# Patient Record
Sex: FEMALE | Race: BLACK OR AFRICAN AMERICAN | NOT HISPANIC OR LATINO | Employment: OTHER | URBAN - METROPOLITAN AREA
[De-identification: names, ages, dates, MRNs, and addresses within clinical notes are randomized per-mention and may not be internally consistent; named-entity substitution may affect disease eponyms.]

---

## 2017-01-18 ENCOUNTER — TELEPHONE (OUTPATIENT)
Dept: INTERNAL MEDICINE | Facility: CLINIC | Age: 62
End: 2017-01-18

## 2017-01-18 NOTE — TELEPHONE ENCOUNTER
----- Message from Jeovany Diaz sent at 1/17/2017  3:57 PM CST -----  Contact: self   Patient would like to get test results.  Name of test (lab, mammo, etc.):  X-ray of toe   Date of test:  11/28  Ordering provider: Dr. Mustafa   Where was the test performed:  [facility]  Comments:

## 2017-02-10 ENCOUNTER — OFFICE VISIT (OUTPATIENT)
Dept: INTERNAL MEDICINE | Facility: CLINIC | Age: 62
End: 2017-02-10
Payer: COMMERCIAL

## 2017-02-10 ENCOUNTER — LAB VISIT (OUTPATIENT)
Dept: LAB | Facility: HOSPITAL | Age: 62
End: 2017-02-10
Attending: INTERNAL MEDICINE
Payer: COMMERCIAL

## 2017-02-10 VITALS
WEIGHT: 145.94 LBS | SYSTOLIC BLOOD PRESSURE: 113 MMHG | HEART RATE: 86 BPM | TEMPERATURE: 98 F | HEIGHT: 66 IN | DIASTOLIC BLOOD PRESSURE: 71 MMHG | RESPIRATION RATE: 12 BRPM | BODY MASS INDEX: 23.46 KG/M2

## 2017-02-10 DIAGNOSIS — R10.31 RLQ ABDOMINAL PAIN: Primary | ICD-10-CM

## 2017-02-10 DIAGNOSIS — R10.31 RLQ ABDOMINAL PAIN: ICD-10-CM

## 2017-02-10 LAB
ALBUMIN SERPL BCP-MCNC: 3.7 G/DL
ALP SERPL-CCNC: 100 U/L
ALT SERPL W/O P-5'-P-CCNC: 61 U/L
ANION GAP SERPL CALC-SCNC: 9 MMOL/L
AST SERPL-CCNC: 26 U/L
BASOPHILS # BLD AUTO: 0.02 K/UL
BASOPHILS NFR BLD: 0.3 %
BILIRUB SERPL-MCNC: 0.6 MG/DL
BUN SERPL-MCNC: 8 MG/DL
CALCIUM SERPL-MCNC: 9.5 MG/DL
CHLORIDE SERPL-SCNC: 102 MMOL/L
CO2 SERPL-SCNC: 28 MMOL/L
CREAT SERPL-MCNC: 0.9 MG/DL
CRP SERPL-MCNC: 65.3 MG/L
DIFFERENTIAL METHOD: ABNORMAL
EOSINOPHIL # BLD AUTO: 0.1 K/UL
EOSINOPHIL NFR BLD: 1.8 %
ERYTHROCYTE [DISTWIDTH] IN BLOOD BY AUTOMATED COUNT: 12.5 %
ERYTHROCYTE [SEDIMENTATION RATE] IN BLOOD BY WESTERGREN METHOD: 56 MM/HR
EST. GFR  (AFRICAN AMERICAN): >60 ML/MIN/1.73 M^2
EST. GFR  (NON AFRICAN AMERICAN): >60 ML/MIN/1.73 M^2
GLUCOSE SERPL-MCNC: 98 MG/DL
HCT VFR BLD AUTO: 38.8 %
HGB BLD-MCNC: 12.5 G/DL
LYMPHOCYTES # BLD AUTO: 1.3 K/UL
LYMPHOCYTES NFR BLD: 18.5 %
MCH RBC QN AUTO: 29.2 PG
MCHC RBC AUTO-ENTMCNC: 32.2 %
MCV RBC AUTO: 91 FL
MONOCYTES # BLD AUTO: 0.4 K/UL
MONOCYTES NFR BLD: 6.2 %
NEUTROPHILS # BLD AUTO: 4.9 K/UL
NEUTROPHILS NFR BLD: 73.2 %
PLATELET # BLD AUTO: 330 K/UL
PMV BLD AUTO: 9.4 FL
POTASSIUM SERPL-SCNC: 4.1 MMOL/L
PROT SERPL-MCNC: 7.9 G/DL
RBC # BLD AUTO: 4.28 M/UL
SODIUM SERPL-SCNC: 139 MMOL/L
WBC # BLD AUTO: 6.74 K/UL

## 2017-02-10 PROCEDURE — 86140 C-REACTIVE PROTEIN: CPT

## 2017-02-10 PROCEDURE — 85651 RBC SED RATE NONAUTOMATED: CPT

## 2017-02-10 PROCEDURE — 80053 COMPREHEN METABOLIC PANEL: CPT

## 2017-02-10 PROCEDURE — 99214 OFFICE O/P EST MOD 30 MIN: CPT | Mod: S$GLB,,, | Performed by: INTERNAL MEDICINE

## 2017-02-10 PROCEDURE — 99999 PR PBB SHADOW E&M-EST. PATIENT-LVL III: CPT | Mod: PBBFAC,,, | Performed by: INTERNAL MEDICINE

## 2017-02-10 PROCEDURE — 85025 COMPLETE CBC W/AUTO DIFF WBC: CPT

## 2017-02-10 PROCEDURE — 36415 COLL VENOUS BLD VENIPUNCTURE: CPT | Mod: PO

## 2017-02-10 NOTE — PROGRESS NOTES
Subjective:       Patient ID: Maya Ervin is a 61 y.o. female.    Chief Complaint: Abdominal Cramping    HPI     61-year-old female here for evaluation of abdominal cramping.  She has had gas pains for a week.  She was walking and had a cramp in the abdominal area.  She noticed that she had pain in the LLQ area.  This then goes around in her stomach area.  She releases gas and feels fine.  This keeps happening.  She feels bloated and cramping.  She improves after she has gas.  This has been going on a week.  No nausea, vomiting, changes in bowel habits.  She tried milk of magnesia.  She usually goes twice a day.  In the last week, she has not been going twice a day, but is not eating as much as she usually does.  She is having at least one bowel movement a day in the last week.  She is eating and having a bowel movement.  She has had no recent antibiotics.  She has not tried probiotics.  She has not tried anything OTC.  Her diet has not changed leading up to this change in her abdomen.      Review of Systems    Objective:      Physical Exam   Constitutional: She is oriented to person, place, and time. She appears well-developed and well-nourished.   HENT:   Head: Normocephalic and atraumatic.   Mouth/Throat: No oropharyngeal exudate.   Eyes: EOM are normal. Pupils are equal, round, and reactive to light. Right eye exhibits no discharge. Left eye exhibits no discharge. No scleral icterus.   Neck: Normal range of motion. Neck supple. No tracheal deviation present. No thyromegaly present.   Cardiovascular: Normal rate, regular rhythm and normal heart sounds.  Exam reveals no gallop and no friction rub.    No murmur heard.  Pulmonary/Chest: Effort normal and breath sounds normal. No respiratory distress. She has no wheezes. She has no rales. She exhibits no tenderness.   Abdominal: Soft. Bowel sounds are normal. She exhibits no distension and no mass. There is tenderness in the right lower quadrant. There is no  rebound and no guarding.   Musculoskeletal: Normal range of motion. She exhibits no edema or tenderness.   Neurological: She is alert and oriented to person, place, and time.   Skin: Skin is warm and dry. No rash noted. No erythema. No pallor.   Psychiatric: She has a normal mood and affect. Her behavior is normal.   Vitals reviewed.      Assessment:       1. RLQ abdominal pain        Plan:       1.  Check CBC, CMP, ESR, CRP.  Check CT scan abdomen pelvis.  Concern for possible infection.  If CT scan negative, would consider prescribing Augmentin twice a day for 10 days.  Patient recommended to get probiotics and take these in the meantime.

## 2017-02-11 ENCOUNTER — TELEPHONE (OUTPATIENT)
Dept: INTERNAL MEDICINE | Facility: CLINIC | Age: 62
End: 2017-02-11

## 2017-02-11 RX ORDER — CIPROFLOXACIN 500 MG/1
500 TABLET ORAL 2 TIMES DAILY
Qty: 20 TABLET | Refills: 0 | Status: SHIPPED | OUTPATIENT
Start: 2017-02-11 | End: 2017-02-18

## 2017-02-11 RX ORDER — METRONIDAZOLE 500 MG/1
500 TABLET ORAL 3 TIMES DAILY
Qty: 30 TABLET | Refills: 0 | Status: SHIPPED | OUTPATIENT
Start: 2017-02-11 | End: 2017-02-21

## 2017-02-13 ENCOUNTER — TELEPHONE (OUTPATIENT)
Dept: INTERNAL MEDICINE | Facility: CLINIC | Age: 62
End: 2017-02-13

## 2017-02-13 NOTE — TELEPHONE ENCOUNTER
----- Message from Paco Wahl MD sent at 2/10/2017  1:50 PM CST -----  Blood counts, electro lites, kidney/liver function are normal.  Patient's CRP is elevated.  Awaiting sedimentation rate.  If this also elevated, would be inclined to call in Cipro and Flagyl.

## 2017-02-13 NOTE — TELEPHONE ENCOUNTER
----- Message from Paco Wahl MD sent at 2/11/2017  7:52 AM CST -----  Sedimentation rate also positive.  Calling in Cipro 500 mg twice a day ×10 days, Flagyl  500 mg 3 times a day ×10 days.

## 2017-02-13 NOTE — TELEPHONE ENCOUNTER
Between history lab work, I thought that this was an infection of her colon.  She would need to take both the Cipro and Flagyl in order to treat the infection.  If she's not to take both, she should not take either.

## 2017-02-13 NOTE — TELEPHONE ENCOUNTER
----- Message from Isabela Alvarenga sent at 2/13/2017  8:26 AM CST -----  Contact: patient on cell [phone]  Patient asked to speak with Juanita .  ordered two medications and she only took one of them because of the side effects. Pt is feeling better and canceled the ct scan. Pt said that if it is necessary to have this test, she would prefer to go to main campus but at this time because she is feeling better. Pt also started Activia before starting the medication.    The medication that patient didn't take is ciprofloxacin. After reading the warnings pt felt like the risks weren't worth the benefits.

## 2017-02-14 NOTE — TELEPHONE ENCOUNTER
Called for prior authorization.    Kaylyn - nurse reviewer.    Through for March 11th.    [phone]

## 2017-02-17 ENCOUNTER — TELEPHONE (OUTPATIENT)
Dept: INTERNAL MEDICINE | Facility: CLINIC | Age: 62
End: 2017-02-17

## 2017-02-17 ENCOUNTER — HOSPITAL ENCOUNTER (OUTPATIENT)
Dept: RADIOLOGY | Facility: OTHER | Age: 62
Discharge: HOME | End: 2017-02-17
Attending: INTERNAL MEDICINE
Payer: COMMERCIAL

## 2017-02-17 DIAGNOSIS — R10.31 RLQ ABDOMINAL PAIN: ICD-10-CM

## 2017-02-17 PROCEDURE — 74177 CT ABD & PELVIS W/CONTRAST: CPT | Mod: 26,,, | Performed by: RADIOLOGY

## 2017-02-17 PROCEDURE — 74177 CT ABD & PELVIS W/CONTRAST: CPT | Mod: TC

## 2017-02-17 PROCEDURE — 25500020 PHARM REV CODE 255: Performed by: INTERNAL MEDICINE

## 2017-02-17 RX ADMIN — IOHEXOL 75 ML: 350 INJECTION, SOLUTION INTRAVENOUS at 12:02

## 2017-02-17 NOTE — TELEPHONE ENCOUNTER
----- Message from Paco Wahl MD sent at 2/17/2017  1:22 PM CST -----  Patient has colitis, which necessitates treatment with Cipro and Flagyl.  This is an infection of patient's bowel.  Patient does have a small fat-containing umbilical hernia, which is not likely to be contributing to her pain.  If she fails to improve with Cipro and Flagyl, needs to go to emergency room.

## 2017-02-17 NOTE — TELEPHONE ENCOUNTER
Patient informed me as stated  Patient did rescheduled u/s from 2/24/ to this morning.  Please advise.

## 2017-02-17 NOTE — TELEPHONE ENCOUNTER
"----- Message from Carla Balderrama sent at 2/17/2017  7:03 AM CST -----  Contact: Self  [phone] ONLY  Last seen on 2/10.  Her problems  is worst then when she saw you and not getting better.  She's still has stomach discomfort, bloating and nauseating and a pain above her belly button. She has a decrease in her appetite. [facility]   [phone] (Phone)  [phone] (Fax).    Please, if you need to speak with her please "ONLY" call her and not her emergency .   "

## 2017-02-17 NOTE — TELEPHONE ENCOUNTER
Spoke with patient and informed per Dr. Wahl.  Patient states she's at [facility] awaiting the CT scan.  She states she'd rather wait for the results than go to the ER.    Patient informed that if she does worsen, that she needs to report to the ER.    Dr. Wahl informed.

## 2017-02-20 ENCOUNTER — OFFICE VISIT (OUTPATIENT)
Dept: INTERNAL MEDICINE | Facility: CLINIC | Age: 62
End: 2017-02-20
Payer: COMMERCIAL

## 2017-02-20 VITALS
DIASTOLIC BLOOD PRESSURE: 88 MMHG | HEIGHT: 66 IN | TEMPERATURE: 98 F | WEIGHT: 143.5 LBS | BODY MASS INDEX: 23.06 KG/M2 | HEART RATE: 97 BPM | RESPIRATION RATE: 16 BRPM | SYSTOLIC BLOOD PRESSURE: 139 MMHG

## 2017-02-20 DIAGNOSIS — K59.00 CONSTIPATION, UNSPECIFIED CONSTIPATION TYPE: ICD-10-CM

## 2017-02-20 DIAGNOSIS — K52.9 COLITIS PRESUMED INFECTIOUS: Primary | ICD-10-CM

## 2017-02-20 PROCEDURE — 99213 OFFICE O/P EST LOW 20 MIN: CPT | Mod: S$GLB,,, | Performed by: INTERNAL MEDICINE

## 2017-02-20 PROCEDURE — 99999 PR PBB SHADOW E&M-EST. PATIENT-LVL III: CPT | Mod: PBBFAC,,, | Performed by: INTERNAL MEDICINE

## 2017-02-20 PROCEDURE — 1160F RVW MEDS BY RX/DR IN RCRD: CPT | Mod: S$GLB,,, | Performed by: INTERNAL MEDICINE

## 2017-05-31 ENCOUNTER — OFFICE VISIT (OUTPATIENT)
Dept: OPTOMETRY | Facility: CLINIC | Age: 62
End: 2017-05-31
Payer: COMMERCIAL

## 2017-05-31 DIAGNOSIS — H35.9 RETINAL DISORDER, LEFT: Primary | ICD-10-CM

## 2017-05-31 DIAGNOSIS — H25.13 NUCLEAR SCLEROSIS, BILATERAL: ICD-10-CM

## 2017-05-31 PROCEDURE — 92015 DETERMINE REFRACTIVE STATE: CPT | Mod: S$GLB,,, | Performed by: OPTOMETRIST

## 2017-05-31 PROCEDURE — 99999 PR PBB SHADOW E&M-EST. PATIENT-LVL II: CPT | Mod: PBBFAC,,, | Performed by: OPTOMETRIST

## 2017-05-31 PROCEDURE — 92014 COMPRE OPH EXAM EST PT 1/>: CPT | Mod: S$GLB,,, | Performed by: OPTOMETRIST

## 2017-05-31 NOTE — PROGRESS NOTES
HPI     DLS: 5/30/2016  Patient denies blurry vision, or seeing flashes of light or floaters   (-) pain  (-) irritation  (-) eye drops     Last edited by Andra Harris on 5/31/2017  7:35 AM. (History)        ROS     Positive for: Eyes    Negative for: Constitutional, Gastrointestinal, Neurological, Skin,   Genitourinary, Musculoskeletal, HENT, Endocrine, Cardiovascular,   Respiratory, Psychiatric, Allergic/Imm, Heme/Lymph    Last edited by Morales Cordoba, OD on 5/31/2017  7:46 AM. (History)        Assessment /Plan     For exam results, see Encounter Report.    Retinal disorder, left    Nuclear sclerosis, bilateral      1. [facility] OU--wrote new spex Rx  2. Pt has hyperemic disc OS w prominent vasculature, and arcade vessel inferior which is lifted into the vit. Suspected was just be AV malformation, but set up retinal appt last year for pt, but she no showed.   she says she couldn't make it due to death in family, and never rescheduled.      PLAN:    Consult--Dr Thornton.  Pt left w appt slip in hand

## 2017-11-16 DIAGNOSIS — E55.9 VITAMIN D DEFICIENCY: ICD-10-CM

## 2017-11-16 DIAGNOSIS — Z00.00 ROUTINE GENERAL MEDICAL EXAMINATION AT A HEALTH CARE FACILITY: ICD-10-CM

## 2017-11-16 DIAGNOSIS — E78.5 HYPERLIPIDEMIA, UNSPECIFIED HYPERLIPIDEMIA TYPE: Primary | ICD-10-CM

## 2017-11-21 ENCOUNTER — LAB VISIT (OUTPATIENT)
Dept: LAB | Facility: HOSPITAL | Age: 62
End: 2017-11-21
Attending: INTERNAL MEDICINE
Payer: COMMERCIAL

## 2017-11-21 DIAGNOSIS — E78.5 HYPERLIPIDEMIA, UNSPECIFIED HYPERLIPIDEMIA TYPE: ICD-10-CM

## 2017-11-21 DIAGNOSIS — E55.9 VITAMIN D DEFICIENCY: ICD-10-CM

## 2017-11-21 DIAGNOSIS — Z00.00 ROUTINE GENERAL MEDICAL EXAMINATION AT A HEALTH CARE FACILITY: ICD-10-CM

## 2017-11-21 LAB
25(OH)D3+25(OH)D2 SERPL-MCNC: 17 NG/ML
ALBUMIN SERPL BCP-MCNC: 3.8 G/DL
ALP SERPL-CCNC: 78 U/L
ALT SERPL W/O P-5'-P-CCNC: 22 U/L
ANION GAP SERPL CALC-SCNC: 7 MMOL/L
AST SERPL-CCNC: 23 U/L
BASOPHILS # BLD AUTO: 0.02 K/UL
BASOPHILS NFR BLD: 0.6 %
BILIRUB SERPL-MCNC: 0.5 MG/DL
BUN SERPL-MCNC: 9 MG/DL
CALCIUM SERPL-MCNC: 9.5 MG/DL
CHLORIDE SERPL-SCNC: 102 MMOL/L
CHOLEST SERPL-MCNC: 239 MG/DL
CHOLEST/HDLC SERPL: 3.4 {RATIO}
CO2 SERPL-SCNC: 29 MMOL/L
CREAT SERPL-MCNC: 0.8 MG/DL
DIFFERENTIAL METHOD: ABNORMAL
EOSINOPHIL # BLD AUTO: 0 K/UL
EOSINOPHIL NFR BLD: 1.1 %
ERYTHROCYTE [DISTWIDTH] IN BLOOD BY AUTOMATED COUNT: 12.6 %
EST. GFR  (AFRICAN AMERICAN): >60 ML/MIN/1.73 M^2
EST. GFR  (NON AFRICAN AMERICAN): >60 ML/MIN/1.73 M^2
GLUCOSE SERPL-MCNC: 95 MG/DL
HCT VFR BLD AUTO: 40 %
HDLC SERPL-MCNC: 70 MG/DL
HDLC SERPL: 29.3 %
HGB BLD-MCNC: 12.8 G/DL
IMM GRANULOCYTES # BLD AUTO: 0.01 K/UL
IMM GRANULOCYTES NFR BLD AUTO: 0.3 %
LDLC SERPL CALC-MCNC: 145.6 MG/DL
LYMPHOCYTES # BLD AUTO: 1.2 K/UL
LYMPHOCYTES NFR BLD: 33.4 %
MCH RBC QN AUTO: 29.1 PG
MCHC RBC AUTO-ENTMCNC: 32 G/DL
MCV RBC AUTO: 91 FL
MONOCYTES # BLD AUTO: 0.2 K/UL
MONOCYTES NFR BLD: 6.4 %
NEUTROPHILS # BLD AUTO: 2.1 K/UL
NEUTROPHILS NFR BLD: 58.2 %
NONHDLC SERPL-MCNC: 169 MG/DL
NRBC BLD-RTO: 0 /100 WBC
PLATELET # BLD AUTO: 309 K/UL
PMV BLD AUTO: 9.6 FL
POTASSIUM SERPL-SCNC: 3.9 MMOL/L
PROT SERPL-MCNC: 7.9 G/DL
RBC # BLD AUTO: 4.4 M/UL
SODIUM SERPL-SCNC: 138 MMOL/L
T4 FREE SERPL-MCNC: 0.98 NG/DL
TRIGL SERPL-MCNC: 117 MG/DL
TSH SERPL DL<=0.005 MIU/L-ACNC: 4.25 UIU/ML
WBC # BLD AUTO: 3.62 K/UL

## 2017-11-21 PROCEDURE — 84439 ASSAY OF FREE THYROXINE: CPT

## 2017-11-21 PROCEDURE — 80053 COMPREHEN METABOLIC PANEL: CPT

## 2017-11-21 PROCEDURE — 84443 ASSAY THYROID STIM HORMONE: CPT

## 2017-11-21 PROCEDURE — 82306 VITAMIN D 25 HYDROXY: CPT

## 2017-11-21 PROCEDURE — 36415 COLL VENOUS BLD VENIPUNCTURE: CPT | Mod: PO

## 2017-11-21 PROCEDURE — 85025 COMPLETE CBC W/AUTO DIFF WBC: CPT

## 2017-11-21 PROCEDURE — 80061 LIPID PANEL: CPT

## 2017-11-29 ENCOUNTER — TELEPHONE (OUTPATIENT)
Dept: INTERNAL MEDICINE | Facility: CLINIC | Age: 62
End: 2017-11-29

## 2017-11-29 DIAGNOSIS — Z12.39 SCREENING BREAST EXAMINATION: Primary | ICD-10-CM

## 2017-11-29 NOTE — TELEPHONE ENCOUNTER
----- Message from Torie iSms sent at 11/29/2017  1:44 PM CST -----  Contact: Self/[phone]  Patient would like to schedule their annual mammogram appointment, please enter the order and contact the patient to schedule.

## 2018-03-20 ENCOUNTER — HOSPITAL ENCOUNTER (OUTPATIENT)
Dept: RADIOLOGY | Facility: HOSPITAL | Age: 63
Discharge: HOME | End: 2018-03-20
Attending: INTERNAL MEDICINE
Payer: COMMERCIAL

## 2018-03-20 ENCOUNTER — OFFICE VISIT (OUTPATIENT)
Dept: INTERNAL MEDICINE | Facility: CLINIC | Age: 63
End: 2018-03-20
Payer: COMMERCIAL

## 2018-03-20 VITALS
WEIGHT: 147.5 LBS | BODY MASS INDEX: 24.17 KG/M2 | DIASTOLIC BLOOD PRESSURE: 80 MMHG | HEART RATE: 88 BPM | RESPIRATION RATE: 14 BRPM | SYSTOLIC BLOOD PRESSURE: 110 MMHG | TEMPERATURE: 99 F

## 2018-03-20 DIAGNOSIS — E55.9 VITAMIN D DEFICIENCY: ICD-10-CM

## 2018-03-20 DIAGNOSIS — Z12.39 SCREENING BREAST EXAMINATION: ICD-10-CM

## 2018-03-20 DIAGNOSIS — R79.89 ELEVATED TSH: ICD-10-CM

## 2018-03-20 DIAGNOSIS — Z00.00 WELL ADULT EXAM: Primary | ICD-10-CM

## 2018-03-20 DIAGNOSIS — E78.5 HYPERLIPIDEMIA, UNSPECIFIED HYPERLIPIDEMIA TYPE: ICD-10-CM

## 2018-03-20 PROCEDURE — 99396 PREV VISIT EST AGE 40-64: CPT | Mod: S$GLB,,, | Performed by: INTERNAL MEDICINE

## 2018-03-20 PROCEDURE — 77067 SCR MAMMO BI INCL CAD: CPT | Mod: TC,PO

## 2018-03-20 PROCEDURE — 99999 PR PBB SHADOW E&M-EST. PATIENT-LVL III: CPT | Mod: PBBFAC,,, | Performed by: INTERNAL MEDICINE

## 2018-03-20 PROCEDURE — 77063 BREAST TOMOSYNTHESIS BI: CPT | Mod: 26,,, | Performed by: RADIOLOGY

## 2018-03-20 PROCEDURE — 77067 SCR MAMMO BI INCL CAD: CPT | Mod: 26,,, | Performed by: RADIOLOGY

## 2018-03-20 NOTE — PROGRESS NOTES
Subjective:       Patient ID: Maya Ervin is a 62 y.o. female.    Chief Complaint: Annual Exam and Hyperlipidemia    HPI   The patient presents for annual physical examination.  She has been feeling well.  We discussed reducing fat intake in the diet to help manage her hyperlipidemia.  She is no longer experiencing any gastrointestinal symptoms.  She is exercising occasionally.  She is resting well at night.  She will be due for her colonoscopy in 2019.  The medical record was reviewed.  Medication list was reconciled.    Review of Systems   Constitutional: Negative for fatigue, fever and unexpected weight change.   HENT: Negative for congestion, postnasal drip, rhinorrhea and sore throat.    Eyes: Negative for visual disturbance.   Respiratory: Negative for cough, chest tightness, shortness of breath and wheezing.    Cardiovascular: Negative for chest pain, palpitations and leg swelling.   Gastrointestinal: Negative for abdominal pain and blood in stool.   Genitourinary: Negative for dysuria, frequency and hematuria.   Musculoskeletal: Negative for arthralgias, back pain, joint swelling and myalgias.   Skin: Negative for rash.   Neurological: Negative for dizziness, syncope, weakness, numbness and headaches.   Psychiatric/Behavioral: Negative for sleep disturbance. The patient is not nervous/anxious.        Objective:      Physical Exam   Constitutional: She is oriented to person, place, and time. Vital signs are normal. She appears well-developed and well-nourished. No distress.   HENT:   Head: Normocephalic and atraumatic.   Right Ear: External ear normal.   Left Ear: External ear normal.   Nose: Nose normal.   Mouth/Throat: Oropharynx is clear and moist. No oropharyngeal exudate.   Eyes: Conjunctivae and EOM are normal. Pupils are equal, round, and reactive to light. No scleral icterus.   Neck: Normal range of motion. Neck supple. No JVD present. Carotid bruit is not present. No thyromegaly present.    Cardiovascular: Normal rate, regular rhythm, normal heart sounds, intact distal pulses and normal pulses.  Exam reveals no gallop and no friction rub.    No murmur heard.  Pulmonary/Chest: Effort normal and breath sounds normal. No respiratory distress. She has no wheezes. She has no rales.   Abdominal: Soft. Bowel sounds are normal. She exhibits no abdominal bruit and no mass. There is no splenomegaly or hepatomegaly. There is no tenderness. No hernia.   Musculoskeletal: Normal range of motion. She exhibits no edema or tenderness.        Right shoulder: She exhibits no effusion and no deformity.   Lymphadenopathy:     She has no cervical adenopathy.     She has no axillary adenopathy.        Right: No supraclavicular adenopathy present.        Left: No supraclavicular adenopathy present.   Neurological: She is alert and oriented to person, place, and time. She has normal strength. No cranial nerve deficit.   Skin: Skin is warm and dry. No rash noted.   Psychiatric: She has a normal mood and affect. Her speech is normal and behavior is normal.   Nursing note and vitals reviewed.      Results for orders placed or performed in visit on 11/21/17   CBC auto differential   Result Value Ref Range    WBC 3.62 (L) 3.90 - 12.70 K/uL    RBC 4.40 4.00 - 5.40 M/uL    Hemoglobin 12.8 12.0 - 16.0 g/dL    Hematocrit 40.0 37.0 - 48.5 %    MCV 91 82 - 98 fL    MCH 29.1 27.0 - 31.0 pg    MCHC 32.0 32.0 - 36.0 g/dL    RDW 12.6 11.5 - 14.5 %    Platelets 309 150 - 350 K/uL    MPV 9.6 9.2 - 12.9 fL    Immature Granulocytes 0.3 0.0 - 0.5 %    Gran # (ANC) 2.1 1.8 - 7.7 K/uL    Immature Grans (Abs) 0.01 0.00 - 0.04 K/uL    Lymph # 1.2 1.0 - 4.8 K/uL    Mono # 0.2 (L) 0.3 - 1.0 K/uL    Eos # 0.0 0.0 - 0.5 K/uL    Baso # 0.02 0.00 - 0.20 K/uL    nRBC 0 0 /100 WBC    Gran% 58.2 38.0 - 73.0 %    Lymph% 33.4 18.0 - 48.0 %    Mono% 6.4 4.0 - 15.0 %    Eosinophil% 1.1 0.0 - 8.0 %    Basophil% 0.6 0.0 - 1.9 %    Differential Method Automated     Comprehensive metabolic panel   Result Value Ref Range    Sodium 138 136 - 145 mmol/L    Potassium 3.9 3.5 - 5.1 mmol/L    Chloride 102 95 - 110 mmol/L    CO2 29 23 - 29 mmol/L    Glucose 95 70 - 110 mg/dL    BUN, Bld 9 6 - 20 mg/dL    Creatinine 0.8 0.5 - 1.4 mg/dL    Calcium 9.5 8.7 - 10.5 mg/dL    Total Protein 7.9 6.0 - 8.4 g/dL    Albumin 3.8 3.5 - 5.2 g/dL    Total Bilirubin 0.5 0.1 - 1.0 mg/dL    Alkaline Phosphatase 78 55 - 135 U/L    AST 23 10 - 40 U/L    ALT 22 10 - 44 U/L    Anion Gap 7 (L) 8 - 16 mmol/L    eGFR if African American >60.0 >60 mL/min/1.73 m^2    eGFR if non African American >60.0 >60 mL/min/1.73 m^2   Lipid panel   Result Value Ref Range    Cholesterol 239 (H) 120 - 199 mg/dL    Triglycerides 117 30 - 150 mg/dL    HDL 70 40 - 75 mg/dL    LDL Cholesterol 145.6 63.0 - 159.0 mg/dL    HDL/Chol Ratio 29.3 20.0 - 50.0 %    Total Cholesterol/HDL Ratio 3.4 2.0 - 5.0    Non-HDL Cholesterol 169 mg/dL   TSH   Result Value Ref Range    TSH 4.253 (H) 0.400 - 4.000 uIU/mL   Vitamin D   Result Value Ref Range    Vit D, 25-Hydroxy 17 (L) 30 - 96 ng/mL   T4, free   Result Value Ref Range    Free T4 0.98 0.71 - 1.51 ng/dL       Assessment:       1. Well adult exam    2. Hyperlipidemia, unspecified hyperlipidemia type    3. Vitamin D deficiency    4. Elevated TSH        Plan:       Maya was seen today for annual exam and hyperlipidemia.  The patient was advised to use vitamin D 2000 units daily for treatment of vitamin D deficiency.  A screening mammogram will be obtained today.  Lipid profile and thyroid function tests will be obtained today.  The patient has been encouraged to increase her exercise level.  Annual follow-up is recommended.    Diagnoses and all orders for this visit:    Well adult exam  -     TSH; Future  -     T4, free; Future  -     Lipid panel; Future    Hyperlipidemia, unspecified hyperlipidemia type    Vitamin D deficiency    Elevated TSH

## 2018-03-25 ENCOUNTER — TELEPHONE (OUTPATIENT)
Dept: INTERNAL MEDICINE | Facility: CLINIC | Age: 63
End: 2018-03-25

## 2018-11-08 ENCOUNTER — INITIAL CONSULT (OUTPATIENT)
Dept: OPHTHALMOLOGY | Facility: CLINIC | Age: 63
End: 2018-11-08
Payer: COMMERCIAL

## 2018-11-08 DIAGNOSIS — H43.393 OTHER VITREOUS OPACITIES, BILATERAL: ICD-10-CM

## 2018-11-08 PROCEDURE — 99999 PR PBB SHADOW E&M-EST. PATIENT-LVL II: CPT | Mod: PBBFAC,,, | Performed by: OPHTHALMOLOGY

## 2018-11-08 PROCEDURE — 92225 PR SPECIAL EYE EXAM, INITIAL: CPT | Mod: RT,S$GLB,, | Performed by: OPHTHALMOLOGY

## 2018-11-08 PROCEDURE — 92014 COMPRE OPH EXAM EST PT 1/>: CPT | Mod: S$GLB,,, | Performed by: OPHTHALMOLOGY

## 2018-11-08 NOTE — PROGRESS NOTES
HPI     Concerns About Ocular Health      Additional comments: Retinal Eval- Dr. Cordoba              Comments     Patient states her va seems to be doing well in OU.           Last edited by Von Benitez on 11/8/2018  1:31 PM. (History)        A/P    1. 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

## 2018-11-19 ENCOUNTER — TELEPHONE (OUTPATIENT)
Dept: INTERNAL MEDICINE | Facility: CLINIC | Age: 63
End: 2018-11-19

## 2018-11-19 DIAGNOSIS — Z00.00 ROUTINE GENERAL MEDICAL EXAMINATION AT A HEALTH CARE FACILITY: Primary | ICD-10-CM

## 2018-11-19 NOTE — TELEPHONE ENCOUNTER
----- Message from Juliane Kay sent at 11/19/2018  2:39 PM CST -----  Contact: self/ [phone]  Doctor appointment and lab have been scheduled.  Please link lab orders to the lab appointment.  Date of doctor appointment: 3/22  Physical or EP:  phys  Date of lab appointment: 3/15   Comments:

## 2018-11-20 ENCOUNTER — TELEPHONE (OUTPATIENT)
Dept: OPHTHALMOLOGY | Facility: CLINIC | Age: 63
End: 2018-11-20

## 2018-12-03 ENCOUNTER — TELEPHONE (OUTPATIENT)
Dept: OPHTHALMOLOGY | Facility: CLINIC | Age: 63
End: 2018-12-03

## 2018-12-03 NOTE — TELEPHONE ENCOUNTER
----- Message from Riddhi Daly sent at 11/30/2018  4:36 PM CST -----  Contact: Maya  Needs Advice    Reason for call:Pt called to discuss her current condition and possibly reschedule.        Communication Preference:[phone] cell    Additional Information:Rx may be needed. Patient request([facility])

## 2019-03-12 ENCOUNTER — TELEPHONE (OUTPATIENT)
Dept: INTERNAL MEDICINE | Facility: CLINIC | Age: 64
End: 2019-03-12

## 2019-03-12 DIAGNOSIS — Z12.39 SCREENING BREAST EXAMINATION: Primary | ICD-10-CM

## 2019-03-12 NOTE — TELEPHONE ENCOUNTER
----- Message from Dallas Rocha sent at 3/12/2019  8:40 AM CDT -----  Contact: Self [phone]  Caller is requesting to schedule their annual screening mammogram appointment. Order is not listed in Epic.  Please enter order and contact patient to schedule.  Where would they like the mammogram performed?:  [facility]  Would the patient rather a call back or a response via [facility]?:  Call Back   Additional information:  Pt will like for the Mammogram schedule for for 8:30 or 8:45 am on 3/22

## 2019-03-15 ENCOUNTER — LAB VISIT (OUTPATIENT)
Dept: LAB | Facility: HOSPITAL | Age: 64
End: 2019-03-15
Attending: INTERNAL MEDICINE
Payer: COMMERCIAL

## 2019-03-15 DIAGNOSIS — Z00.00 ROUTINE GENERAL MEDICAL EXAMINATION AT A HEALTH CARE FACILITY: ICD-10-CM

## 2019-03-15 LAB
25(OH)D3+25(OH)D2 SERPL-MCNC: 13 NG/ML
ALBUMIN SERPL BCP-MCNC: 4 G/DL
ALP SERPL-CCNC: 68 U/L
ALT SERPL W/O P-5'-P-CCNC: 20 U/L
ANION GAP SERPL CALC-SCNC: 6 MMOL/L
AST SERPL-CCNC: 26 U/L
BASOPHILS # BLD AUTO: 0.03 K/UL
BASOPHILS NFR BLD: 0.7 %
BILIRUB SERPL-MCNC: 0.4 MG/DL
BUN SERPL-MCNC: 12 MG/DL
CALCIUM SERPL-MCNC: 9.5 MG/DL
CHLORIDE SERPL-SCNC: 107 MMOL/L
CHOLEST SERPL-MCNC: 205 MG/DL
CHOLEST/HDLC SERPL: 3.6 {RATIO}
CO2 SERPL-SCNC: 27 MMOL/L
CREAT SERPL-MCNC: 0.7 MG/DL
DIFFERENTIAL METHOD: ABNORMAL
EOSINOPHIL # BLD AUTO: 0.1 K/UL
EOSINOPHIL NFR BLD: 1.2 %
ERYTHROCYTE [DISTWIDTH] IN BLOOD BY AUTOMATED COUNT: 12.8 %
EST. GFR  (AFRICAN AMERICAN): >60 ML/MIN/1.73 M^2
EST. GFR  (NON AFRICAN AMERICAN): >60 ML/MIN/1.73 M^2
GLUCOSE SERPL-MCNC: 92 MG/DL
HCT VFR BLD AUTO: 37.6 %
HDLC SERPL-MCNC: 57 MG/DL
HDLC SERPL: 27.8 %
HGB BLD-MCNC: 11.8 G/DL
IMM GRANULOCYTES # BLD AUTO: 0.02 K/UL
IMM GRANULOCYTES NFR BLD AUTO: 0.5 %
LDLC SERPL CALC-MCNC: 132.8 MG/DL
LYMPHOCYTES # BLD AUTO: 1.4 K/UL
LYMPHOCYTES NFR BLD: 32.6 %
MCH RBC QN AUTO: 28.9 PG
MCHC RBC AUTO-ENTMCNC: 31.4 G/DL
MCV RBC AUTO: 92 FL
MONOCYTES # BLD AUTO: 0.4 K/UL
MONOCYTES NFR BLD: 8.1 %
NEUTROPHILS # BLD AUTO: 2.5 K/UL
NEUTROPHILS NFR BLD: 56.9 %
NONHDLC SERPL-MCNC: 148 MG/DL
NRBC BLD-RTO: 0 /100 WBC
PLATELET # BLD AUTO: 299 K/UL
PMV BLD AUTO: 9.7 FL
POTASSIUM SERPL-SCNC: 4 MMOL/L
PROT SERPL-MCNC: 7.2 G/DL
RBC # BLD AUTO: 4.08 M/UL
SODIUM SERPL-SCNC: 140 MMOL/L
TRIGL SERPL-MCNC: 76 MG/DL
TSH SERPL DL<=0.005 MIU/L-ACNC: 3.99 UIU/ML
WBC # BLD AUTO: 4.3 K/UL

## 2019-03-15 PROCEDURE — 80053 COMPREHEN METABOLIC PANEL: CPT

## 2019-03-15 PROCEDURE — 36415 COLL VENOUS BLD VENIPUNCTURE: CPT | Mod: PO

## 2019-03-15 PROCEDURE — 85025 COMPLETE CBC W/AUTO DIFF WBC: CPT

## 2019-03-15 PROCEDURE — 82306 VITAMIN D 25 HYDROXY: CPT

## 2019-03-15 PROCEDURE — 84443 ASSAY THYROID STIM HORMONE: CPT

## 2019-03-15 PROCEDURE — 80061 LIPID PANEL: CPT

## 2019-03-22 ENCOUNTER — OFFICE VISIT (OUTPATIENT)
Dept: INTERNAL MEDICINE | Facility: CLINIC | Age: 64
End: 2019-03-22
Payer: COMMERCIAL

## 2019-03-22 ENCOUNTER — HOSPITAL ENCOUNTER (OUTPATIENT)
Dept: RADIOLOGY | Facility: HOSPITAL | Age: 64
Discharge: HOME | End: 2019-03-22
Attending: INTERNAL MEDICINE
Payer: COMMERCIAL

## 2019-03-22 VITALS
HEIGHT: 66 IN | OXYGEN SATURATION: 99 % | DIASTOLIC BLOOD PRESSURE: 76 MMHG | WEIGHT: 146.38 LBS | WEIGHT: 147 LBS | BODY MASS INDEX: 23.99 KG/M2 | HEART RATE: 88 BPM | RESPIRATION RATE: 14 BRPM | TEMPERATURE: 99 F | SYSTOLIC BLOOD PRESSURE: 124 MMHG | BODY MASS INDEX: 23.63 KG/M2

## 2019-03-22 DIAGNOSIS — E78.5 HYPERLIPIDEMIA, UNSPECIFIED HYPERLIPIDEMIA TYPE: ICD-10-CM

## 2019-03-22 DIAGNOSIS — E55.9 VITAMIN D DEFICIENCY: ICD-10-CM

## 2019-03-22 DIAGNOSIS — Z12.11 ENCOUNTER FOR SCREENING COLONOSCOPY: ICD-10-CM

## 2019-03-22 DIAGNOSIS — Z00.00 WELL ADULT EXAM: Primary | ICD-10-CM

## 2019-03-22 DIAGNOSIS — Z12.39 SCREENING BREAST EXAMINATION: ICD-10-CM

## 2019-03-22 PROCEDURE — 99396 PR PREVENTIVE VISIT,EST,40-64: ICD-10-PCS | Mod: S$GLB,,, | Performed by: INTERNAL MEDICINE

## 2019-03-22 PROCEDURE — 77067 SCR MAMMO BI INCL CAD: CPT | Mod: 26,,, | Performed by: RADIOLOGY

## 2019-03-22 PROCEDURE — 77067 MAMMO DIGITAL SCREENING BILAT WITH TOMOSYNTHESIS_CAD: ICD-10-PCS | Mod: 26,,, | Performed by: RADIOLOGY

## 2019-03-22 PROCEDURE — 77063 MAMMO DIGITAL SCREENING BILAT WITH TOMOSYNTHESIS_CAD: ICD-10-PCS | Mod: 26,,, | Performed by: RADIOLOGY

## 2019-03-22 PROCEDURE — 99396 PREV VISIT EST AGE 40-64: CPT | Mod: S$GLB,,, | Performed by: INTERNAL MEDICINE

## 2019-03-22 PROCEDURE — 77067 SCR MAMMO BI INCL CAD: CPT | Mod: TC,PO

## 2019-03-22 PROCEDURE — 99999 PR PBB SHADOW E&M-EST. PATIENT-LVL III: ICD-10-PCS | Mod: PBBFAC,,, | Performed by: INTERNAL MEDICINE

## 2019-03-22 PROCEDURE — 77063 BREAST TOMOSYNTHESIS BI: CPT | Mod: 26,,, | Performed by: RADIOLOGY

## 2019-03-22 PROCEDURE — 99999 PR PBB SHADOW E&M-EST. PATIENT-LVL III: CPT | Mod: PBBFAC,,, | Performed by: INTERNAL MEDICINE

## 2019-03-22 NOTE — PROGRESS NOTES
Subjective:       Patient ID: Maya Ervin is a 63 y.o. female.    Chief Complaint: Annual Exam and Hyperlipidemia    HPI   The patient presents for annual physical examination and for follow-up of hyperlipidemia vitamin-D deficiency.  The patient has been feeling well.  She does not exercise regularly.  She is resting well at night.    No interval change in past medical history, family history, or social history since prior evaluations.    The patient will be due for screening colonoscopy in 06/2019.  She obtained a mammogram today and the results are pending.  Immunization record was reviewed.  Review of Systems   Constitutional: Negative for fatigue, fever and unexpected weight change.   HENT: Negative for congestion, postnasal drip, rhinorrhea and sore throat.    Eyes: Negative for visual disturbance.   Respiratory: Negative for cough, chest tightness, shortness of breath and wheezing.    Cardiovascular: Negative for chest pain, palpitations and leg swelling.   Gastrointestinal: Negative for abdominal pain and blood in stool.   Genitourinary: Negative for dysuria, frequency and hematuria.   Musculoskeletal: Negative for arthralgias, back pain, joint swelling and myalgias.   Skin: Negative for rash.   Neurological: Negative for dizziness, syncope, weakness, numbness and headaches.   Psychiatric/Behavioral: Negative for sleep disturbance. The patient is not nervous/anxious.        Objective:      Physical Exam   Constitutional: She is oriented to person, place, and time. Vital signs are normal. She appears well-developed and well-nourished. No distress.   HENT:   Head: Normocephalic and atraumatic.   Right Ear: External ear normal.   Left Ear: External ear normal.   Nose: Nose normal.   Mouth/Throat: Oropharynx is clear and moist. No oropharyngeal exudate.   Eyes: Pupils are equal, round, and reactive to light. Conjunctivae and EOM are normal. No scleral icterus.   Neck: Normal range of motion. Neck supple. No  JVD present. Carotid bruit is not present. No thyromegaly present.   Cardiovascular: Normal rate, regular rhythm, normal heart sounds, intact distal pulses and normal pulses. Exam reveals no gallop and no friction rub.   No murmur heard.  Pulmonary/Chest: Effort normal and breath sounds normal. No respiratory distress. She has no wheezes. She has no rales.   Abdominal: Soft. Bowel sounds are normal. She exhibits no abdominal bruit and no mass. There is no splenomegaly or hepatomegaly. There is no tenderness. No hernia.   Musculoskeletal: Normal range of motion. She exhibits no edema or tenderness.        Right shoulder: She exhibits no effusion and no deformity.   Lymphadenopathy:     She has no cervical adenopathy.     She has no axillary adenopathy.        Right: No supraclavicular adenopathy present.        Left: No supraclavicular adenopathy present.   Neurological: She is alert and oriented to person, place, and time. She has normal strength. No cranial nerve deficit.   Skin: Skin is warm and dry. No rash noted.   Psychiatric: She has a normal mood and affect. Her speech is normal and behavior is normal.   Nursing note and vitals reviewed.      Results for orders placed or performed in visit on 03/15/19   Urinalysis   Result Value Ref Range    Specimen UA Urine, Clean Catch     Color, UA Yellow Yellow, Straw, Nannette    Appearance, UA Clear Clear    pH, UA 5.0 5.0 - 8.0    Specific Gravity, UA 1.030 1.005 - 1.030    Protein, UA Negative Negative    Glucose, UA Negative Negative    Ketones, UA Negative Negative    Bilirubin (UA) Negative Negative    Occult Blood UA 1+ (A) Negative    Nitrite, UA Negative Negative    Leukocytes, UA Negative Negative   Urinalysis Microscopic   Result Value Ref Range    RBC, UA 1 0 - 4 /hpf    WBC, UA 3 0 - 5 /hpf    Bacteria, UA Occasional None-Occ /hpf    Squam Epithel, UA 1 /hpf    Microscopic Comment SEE COMMENT      Other labs were reviewed.  Assessment:       1. Well adult exam     2. Hyperlipidemia, unspecified hyperlipidemia type    3. Vitamin D deficiency    4. Encounter for screening colonoscopy        Plan:     Maya was seen today for annual exam and hyperlipidemia.  Screening colonoscopy will be obtained at the [facility] at the end June 2019.  Vitamin-D in the dose of 2000 units daily is recommended.  The patient is to return to clinic annually and as needed.    Diagnoses and all orders for this visit:    Well adult exam    Hyperlipidemia, unspecified hyperlipidemia type    Vitamin D deficiency    Encounter for screening colonoscopy  -     Case request GI: COLONOSCOPY    Other orders  -     varicella-zoster [id number], PF, (SHINGRIX, PF,) 50 mcg/0.5 mL injection; Inject 0.5 mLs into the muscle once. for 1 dose

## 2019-03-26 ENCOUNTER — TELEPHONE (OUTPATIENT)
Dept: INTERNAL MEDICINE | Facility: CLINIC | Age: 64
End: 2019-03-26

## 2019-03-26 DIAGNOSIS — D22.9 CHANGE IN MOLE: Primary | ICD-10-CM

## 2019-03-26 NOTE — TELEPHONE ENCOUNTER
----- Message from Carla Balderrama sent at 3/26/2019  7:38 AM CDT -----  Contact: Self call  [phone]  Patient would like to get a referral.  Referral to what specialty:  Derm  Does the patient want the referral with a specific physician:  N/a  Is the specialist an [facility] or [facility]:  Yes  Reason (be specific):  Suspicious mole  Does the patient already have the specialty clinic appointment scheduled: No   If yes, what date is the appointment scheduled:   N/a  Is the insurance listed in Epic correct? (this is important for a referral):  Yes  Comments:

## 2019-04-02 ENCOUNTER — INITIAL CONSULT (OUTPATIENT)
Dept: DERMATOLOGY | Facility: CLINIC | Age: 64
End: 2019-04-02
Payer: COMMERCIAL

## 2019-04-02 DIAGNOSIS — L82.1 SEBORRHEIC KERATOSIS: Primary | ICD-10-CM

## 2019-04-02 PROCEDURE — 99202 OFFICE O/P NEW SF 15 MIN: CPT | Mod: S$GLB,,, | Performed by: DERMATOLOGY

## 2019-04-02 PROCEDURE — 99202 PR OFFICE/OUTPT VISIT, NEW, LEVL II, 15-29 MIN: ICD-10-PCS | Mod: S$GLB,,, | Performed by: DERMATOLOGY

## 2019-04-02 NOTE — LETTER
April 2, 2019      Zachery Mustafa MD  [address]  [place] [address]           [facility] - Dermatology  [address] [place] [id number]  Phone: [phone]  Fax: [phone]          Patient: Maya Ervin   MR Number: [id number]   YOB: 1955   Date of Visit: 4/2/2019       Dear Dr. Zachery Mustafa:    Thank you for referring Maya Ervin to me for evaluation. Attached you will find relevant portions of my assessment and plan of care.    If you have questions, please do not hesitate to call me. I look forward to following Maya Ervin along with you.    Sincerely,    Page Armendariz MD    Enclosure  CC:  No Recipients    If you would like to receive this communication electronically, please contact [email or web address] or [phone] to request more information on [facility] access.    For providers and/or their staff who would like to refer a patient to [facility], please contact us through our one-stop-shop provider referral line, [facility], at [phone].    If you feel you have received this communication in error or would no longer like to receive these types of communications, please e-mail [email or web address]

## 2019-04-02 NOTE — PROGRESS NOTES
Subjective:       Patient ID:  Maya Ervin is a 63 y.o. female who presents for   Chief Complaint   Patient presents with    Lesion     left buttock,      Pt is here today with a c/o of lesion to her left buttock. Pt states that she thinks that she was bitten by a bug and and that she burned her skin by applying rubbing alcohol to it. Pt would also like to make sure that it is not shingles before she gets her vaccine. No change in size, shape, or color. Patient denies any bleeding, pain, or rapid growth.      Lesion  - Initial  Affected locations: right buttock  Duration: 1 week  Signs / symptoms: scabs  Severity: mild  Timing: constant  Exacerbated by: rubbing alcohol.  Relieving factors/Treatments tried: nothing      Review of Systems   Skin: Negative for itching and rash.        Objective:    Physical Exam   Constitutional: She appears well-developed and well-nourished. No distress.   HENT:   Mouth/Throat: Lips normal.    Eyes: Lids are normal.  No conjunctival no injection.   Neurological: She is alert and oriented to person, place, and time. She is not disoriented.   Psychiatric: She has a normal mood and affect.   Skin:   Areas Examined (abnormalities noted in diagram):   Head / Face Inspection Performed  Genitals / Buttocks / Groin Inspection Performed              Diagram Legend     Erythematous scaling macule/papule c/w actinic keratosis       Vascular papule c/w angioma      Pigmented verrucoid papule/plaque c/w seborrheic keratosis      Yellow umbilicated papule c/w sebaceous hyperplasia      Irregularly shaped tan macule c/w lentigo     1-2 mm smooth white papules consistent with Milia      Movable subcutaneous cyst with punctum c/w epidermal inclusion cyst      Subcutaneous movable cyst c/w pilar cyst      Firm pink to brown papule c/w dermatofibroma      Pedunculated fleshy papule(s) c/w skin tag(s)      Evenly pigmented macule c/w junctional nevus     Mildly variegated pigmented, slightly  irregular-bordered macule c/w mildly atypical nevus      Flesh colored to evenly pigmented papule c/w intradermal nevus       Pink pearly papule/plaque c/w basal cell carcinoma      Erythematous hyperkeratotic cursted plaque c/w SCC      Surgical scar with no sign of skin cancer recurrence      Open and closed comedones      Inflammatory papules and pustules      Verrucoid papule consistent consistent with wart     Erythematous eczematous patches and plaques     Dystrophic onycholytic nail with subungual debris c/w onychomycosis     Umbilicated papule    Erythematous-base heme-crusted tan verrucoid plaque consistent with inflamed seborrheic keratosis     Erythematous Silvery Scaling Plaque c/w Psoriasis     See annotation      Assessment / Plan:        Seborrheic keratosis    These are benign, inherited growths without a malignant potential. Reassurance given to patient. No treatment is necessary. Handout was provided.       Follow up if lesion changes or becomes symptomatic.

## 2019-04-18 ENCOUNTER — NURSE TRIAGE (OUTPATIENT)
Dept: ADMINISTRATIVE | Facility: CLINIC | Age: 64
End: 2019-04-18

## 2019-04-18 NOTE — TELEPHONE ENCOUNTER
Reason for Disposition   Mild localized rash   Immunization reactions, questions about    Protocols used: RASH OR REDNESS - [id number], ST IMMUNIZATION [id number]    Raised tiny bumps on upper left arm, swelling and warmth where she received an injection for shingles on Tuesday morning. Patient also c/o headache 4/10. Patient took ibuprofen 30 min ago. Care advice discussed. Patient voices understanding. Please contact caller directly to discuss any further care advice.

## 2019-11-22 ENCOUNTER — TELEPHONE (OUTPATIENT)
Dept: INTERNAL MEDICINE | Facility: CLINIC | Age: 64
End: 2019-11-22

## 2019-11-22 DIAGNOSIS — E55.9 VITAMIN D DEFICIENCY: ICD-10-CM

## 2019-11-22 DIAGNOSIS — E78.5 HYPERLIPIDEMIA, UNSPECIFIED HYPERLIPIDEMIA TYPE: ICD-10-CM

## 2019-11-22 DIAGNOSIS — Z00.00 WELL ADULT EXAM: Primary | ICD-10-CM

## 2019-11-22 DIAGNOSIS — R79.89 ELEVATED TSH: ICD-10-CM

## 2019-11-22 NOTE — TELEPHONE ENCOUNTER
----- Message from Isabela Alvarenga sent at 11/22/2019  1:50 PM CST -----  Contact: fyi  Doctor appointment and lab have been scheduled.  Please link lab orders to the lab appointment.  Date of doctor appointment:  03/24/20  Date of lab appointment:  03/17/20  Physical or EP: physical  Comments:

## 2019-12-02 ENCOUNTER — TELEPHONE (OUTPATIENT)
Dept: INTERNAL MEDICINE | Facility: CLINIC | Age: 64
End: 2019-12-02

## 2019-12-02 DIAGNOSIS — Z12.11 SCREEN FOR COLON CANCER: ICD-10-CM

## 2019-12-02 DIAGNOSIS — Z12.31 OTHER SCREENING MAMMOGRAM: Primary | ICD-10-CM

## 2019-12-02 NOTE — TELEPHONE ENCOUNTER
----- Message from Pretty Tse sent at 12/2/2019 10:51 AM CST -----  Contact: Pt self Mobile [phone]  Caller is requesting to schedule their annual screening mammogram appointment. Order is not listed in Epic.  Please enter order and contact patient to schedule.  Where would they like the mammogram performed?:  At the [facility] location.   Would the patient rather receive a phone call back or a response via [facility]?:   A phone call please  Additional information:  Patient would also like to have her colonoscopy test done. She said that it is time for her to have it done.

## 2019-12-02 NOTE — TELEPHONE ENCOUNTER
----- Message from Pretty Tse sent at 12/2/2019 10:51 AM CST -----  Contact: Pt self Mobile [phone]  Caller is requesting to schedule their annual screening mammogram appointment. Order is not listed in Epic.  Please enter order and contact patient to schedule.  Where would they like the mammogram performed?:  At the [facility] location.   Would the patient rather receive a phone call back or a response via [facility]?:   A phone call please  Additional information:  Patient would also like to have her colonoscopy test done. She said that it is time for her to have it done.

## 2019-12-09 ENCOUNTER — TELEPHONE (OUTPATIENT)
Dept: INTERNAL MEDICINE | Facility: CLINIC | Age: 64
End: 2019-12-09

## 2019-12-09 NOTE — TELEPHONE ENCOUNTER
----- Message from Pool Smith sent at 12/9/2019  7:53 AM CST -----  Contact: Pt [phone]  The patient is requesting a referral for colonoscopy.    Thank you

## 2019-12-09 NOTE — TELEPHONE ENCOUNTER
----- Message from Pool Smith sent at 12/9/2019  7:53 AM CST -----  Contact: Pt [phone]  The patient is requesting a referral for colonoscopy.    Thank you

## 2019-12-09 NOTE — TELEPHONE ENCOUNTER
Patient notfied that orders were entered and that dept may be calling her soon,but she was also given the number to the dept.

## 2019-12-11 ENCOUNTER — TELEPHONE (OUTPATIENT)
Dept: INTERNAL MEDICINE | Facility: CLINIC | Age: 64
End: 2019-12-11

## 2019-12-11 DIAGNOSIS — Z12.11 ENCOUNTER FOR SCREENING COLONOSCOPY: Primary | ICD-10-CM

## 2019-12-11 NOTE — TELEPHONE ENCOUNTER
----- Message from Caroline Farfan sent at 12/11/2019  8:38 AM CST -----  Contact: self   Patient is calling in reference to an order, that what suppose to be faxed in March 2019, to  [facility].  Patient states that [facility] says never received the order as of today. The fax number to [facility]  [phone]. Please call and advise.

## 2019-12-16 ENCOUNTER — TELEPHONE (OUTPATIENT)
Dept: INTERNAL MEDICINE | Facility: CLINIC | Age: 64
End: 2019-12-16

## 2019-12-16 DIAGNOSIS — Z12.11 SPECIAL SCREENING FOR MALIGNANT NEOPLASMS, COLON: Primary | ICD-10-CM

## 2019-12-16 RX ORDER — POLYETHYLENE GLYCOL 3350, SODIUM SULFATE ANHYDROUS, SODIUM BICARBONATE, SODIUM CHLORIDE, POTASSIUM CHLORIDE 236; 22.74; 6.74; 5.86; 2.97 G/4L; G/4L; G/4L; G/4L; G/4L
4 POWDER, FOR SOLUTION ORAL ONCE
Qty: 4000 ML | Refills: 0 | Status: SHIPPED | OUTPATIENT
Start: 2019-12-16 | End: 2019-12-16

## 2019-12-16 NOTE — TELEPHONE ENCOUNTER
----- Message from Pretty Tse sent at 12/16/2019 11:42 AM CST -----  Contact: Pt self Mobile [phone]  Patient would like a call back in regards to her wanting to speak with you about her saying that [facility] its not the right place for her to have an colonoscopy.

## 2020-01-09 ENCOUNTER — HOSPITAL ENCOUNTER (OUTPATIENT)
Facility: HOSPITAL | Age: 65
Discharge: HOME | End: 2020-01-09
Attending: COLON & RECTAL SURGERY | Admitting: COLON & RECTAL SURGERY
Payer: COMMERCIAL

## 2020-01-09 ENCOUNTER — ANESTHESIA (OUTPATIENT)
Dept: ENDOSCOPY | Facility: HOSPITAL | Age: 65
End: 2020-01-09
Payer: COMMERCIAL

## 2020-01-09 ENCOUNTER — ANESTHESIA EVENT (OUTPATIENT)
Dept: ENDOSCOPY | Facility: HOSPITAL | Age: 65
End: 2020-01-09
Payer: COMMERCIAL

## 2020-01-09 VITALS
BODY MASS INDEX: 23.82 KG/M2 | OXYGEN SATURATION: 100 % | HEART RATE: 86 BPM | TEMPERATURE: 98 F | WEIGHT: 143 LBS | RESPIRATION RATE: 16 BRPM | HEIGHT: 65 IN | DIASTOLIC BLOOD PRESSURE: 62 MMHG | SYSTOLIC BLOOD PRESSURE: 117 MMHG

## 2020-01-09 DIAGNOSIS — Z12.11 COLON CANCER SCREENING: ICD-10-CM

## 2020-01-09 PROCEDURE — E9220 PRA ENDO ANESTHESIA: ICD-10-PCS | Mod: ,,, | Performed by: NURSE ANESTHETIST, CERTIFIED REGISTERED

## 2020-01-09 PROCEDURE — G0121 COLON CA SCRN NOT HI RSK IND: HCPCS | Performed by: COLON & RECTAL SURGERY

## 2020-01-09 PROCEDURE — G0121 COLON CA SCRN NOT HI RSK IND: ICD-10-PCS | Mod: ,,, | Performed by: COLON & RECTAL SURGERY

## 2020-01-09 PROCEDURE — 37000009 HC ANESTHESIA EA ADD 15 MINS: Performed by: COLON & RECTAL SURGERY

## 2020-01-09 PROCEDURE — 37000008 HC ANESTHESIA 1ST 15 MINUTES: Performed by: COLON & RECTAL SURGERY

## 2020-01-09 PROCEDURE — 63600175 PHARM REV CODE 636 W HCPCS: Performed by: NURSE ANESTHETIST, CERTIFIED REGISTERED

## 2020-01-09 PROCEDURE — G0121 COLON CA SCRN NOT HI RSK IND: HCPCS | Mod: ,,, | Performed by: COLON & RECTAL SURGERY

## 2020-01-09 PROCEDURE — E9220 PRA ENDO ANESTHESIA: HCPCS | Mod: ,,, | Performed by: NURSE ANESTHETIST, CERTIFIED REGISTERED

## 2020-01-09 RX ORDER — PROPOFOL 10 MG/ML
VIAL (ML) INTRAVENOUS CONTINUOUS PRN
Status: DISCONTINUED | OUTPATIENT
Start: 2020-01-09 | End: 2020-01-09

## 2020-01-09 RX ORDER — SODIUM CHLORIDE 9 MG/ML
INJECTION, SOLUTION INTRAVENOUS CONTINUOUS
Status: DISCONTINUED | OUTPATIENT
Start: 2020-01-09 | End: 2020-01-09 | Stop reason: HOSPADM

## 2020-01-09 RX ORDER — PROPOFOL 10 MG/ML
VIAL (ML) INTRAVENOUS
Status: DISCONTINUED | OUTPATIENT
Start: 2020-01-09 | End: 2020-01-09

## 2020-01-09 RX ADMIN — PROPOFOL 100 MCG/KG/MIN: 10 INJECTION, EMULSION INTRAVENOUS at 07:01

## 2020-01-09 RX ADMIN — PROPOFOL 50 MG: 10 INJECTION, EMULSION INTRAVENOUS at 07:01

## 2020-01-09 NOTE — [employer] PATIENT INSTRUCTIONS
Discharge Summary/Instructions after an Endoscopic Procedure  Patient Name: Maya Ervin  Patient MRN: [id number]  Patient YOB: 1955 Thursday, January 09, 2020  Arturo Guzman MD  RESTRICTIONS:  During your procedure today, you received medications for sedation.  These   medications may affect your judgment, balance and coordination.  Therefore,   for 24 hours, you have the following restrictions:   - DO NOT drive a car, operate machinery, make legal/financial decisions,   sign important papers or drink alcohol.    ACTIVITY:  Today: no heavy lifting, straining or running due to procedural   sedation/anesthesia.  The following day: return to full activity including work.  DIET:  Eat and drink normally unless instructed otherwise.     TREATMENT FOR COMMON SIDE EFFECTS:  - Mild abdominal pain, nausea, belching, bloating or excessive gas:  rest,   eat lightly and use a heating pad.  - Sore Throat: treat with throat lozenges and/or gargle with warm salt   water.  - Because air was used during the procedure, expelling large amounts of air   from your rectum or belching is normal.  - If a bowel prep was taken, you may not have a bowel movement for 1-3 days.    This is normal.  SYMPTOMS TO WATCH FOR AND REPORT TO YOUR PHYSICIAN:  1. Abdominal pain or bloating, other than gas cramps.  2. Chest pain.  3. Back pain.  4. Signs of infection such as: chills or fever occurring within 24 hours   after the procedure.  5. Rectal bleeding, which would show as bright red, maroon, or black stools.   (A tablespoon of blood from the rectum is not serious, especially if   hemorrhoids are present.)  6. Vomiting.  7. Weakness or dizziness.  GO DIRECTLY TO THE NEAREST EMERGENCY ROOM IF YOU HAVE ANY OF THE FOLLOWING:      Difficulty breathing              Chills and/or fever over 101 F   Persistent vomiting and/or vomiting blood   Severe abdominal pain   Severe chest pain   Black, tarry stools   Bleeding- more than one  tablespoon   Any other symptom or condition that you feel may need urgent attention  Your doctor recommends these additional instructions:  If any biopsies were taken, your doctors clinic will contact you in 1 to 2   weeks with any results.  - Discharge patient to home (ambulatory).   - Resume previous diet indefinitely.   - Continue present medications.   - Repeat colonoscopy in 10 years for screening purposes.   - Discharge patient to home (ambulatory).   - Resume previous diet indefinitely.   - Continue present medications.   - Repeat colonoscopy in 10 years for screening purposes.  For questions, problems or results please call your physician - Arturo Guzman MD at Work:  [phone].  [facility], EMERGENCY ROOM PHONE NUMBER: [phone]  IF A COMPLICATION OR EMERGENCY SITUATION ARISES AND YOU ARE UNABLE TO REACH   YOUR PHYSICIAN - GO DIRECTLY TO THE EMERGENCY ROOM.  Arturo Guzman MD  1/9/2020 8:01:49 AM  This report has been verified and signed electronically.  [employer]

## 2020-01-09 NOTE — ANESTHESIA PREPROCEDURE EVALUATION
01/09/2020  Maya Ervin is a 64 y.o., female.    Past Medical History:   Diagnosis Date    Hyperlipidemia     Leukopenia     Unspecified vitamin D deficiency        Anesthesia Evaluation    I have reviewed the Patient Summary Reports.    I have reviewed the Nursing Notes.   I have reviewed the Medications.     Review of Systems  Anesthesia Hx:  Denies Family Hx of Anesthesia complications.   Denies Personal Hx of Anesthesia complications.       Physical Exam  General:  Well nourished    Airway/Jaw/Neck:  Airway Findings: Mouth Opening: Normal Tongue: Normal  General Airway Assessment: Adult  Mallampati: II  TM Distance: Normal, at least 6 cm       Chest/Lungs:  Chest/Lungs Findings: Normal Respiratory Rate     Heart/Vascular:  Heart Findings: Rate: Normal  Sounds: Normal     Abdomen:  Abdomen Findings:  Normal     Musculoskeletal:  Musculoskeletal Findings:    Skin:  Skin Findings:     Mental Status:  Mental Status Findings:  Cooperative, Alert and Oriented         Anesthesia Plan  Type of Anesthesia, risks & benefits discussed:  Anesthesia Type:  general  Patient's Preference: general  Intra-op Monitoring Plan:   Intra-op Monitoring Plan Comments:   Post Op Pain Control Plan:   Post Op Pain Control Plan Comments:   Induction:   IV  Beta Blocker:  Patient is not currently on a Beta-Blocker (No further documentation required).       Informed Consent: Patient understands risks and agrees with Anesthesia plan.  Questions answered. Anesthesia consent signed with patient.  ASA Score: 2     Day of Surgery Review of History & Physical: I have interviewed and examined the patient. I have reviewed the patient's H&P dated:  There are no significant changes.          Ready For Surgery From Anesthesia Perspective.

## 2020-01-09 NOTE — H&P
Endoscopy H&P    Procedure : Colonoscopy      asymptomatic screening exam      Past Medical History:   Diagnosis Date    Hyperlipidemia     Leukopenia     Unspecified vitamin D deficiency        Family History   Problem Relation Age of Onset    Cancer Mother 91        bladder    Cataracts Mother     Hypertension Father     Heart disease Father 69        M.I.    Hypertension Brother     COPD Sister     Cancer Sister         throat    Melanoma Neg Hx        Social History     Socioeconomic History    Marital status: Single     Spouse name: Not on file    Number of children: Not on file    Years of education: Not on file    Highest education level: Not on file   Occupational History    Not on file   Social Needs    Financial resource strain: Not on file    Food insecurity:     Worry: Not on file     Inability: Not on file    Transportation needs:     Medical: Not on file     Non-medical: Not on file   Tobacco Use    Smoking status: Never Smoker    Smokeless tobacco: Never Used   Substance and Sexual Activity    Alcohol use: No    Drug use: No    Sexual activity: Not Currently   Lifestyle    Physical activity:     Days per week: Not on file     Minutes per session: Not on file    Stress: Not on file   Relationships    Social connections:     Talks on phone: Not on file     Gets together: Not on file     Attends Mosque service: Not on file     Active member of club or organization: Not on file     Attends meetings of clubs or organizations: Not on file     Relationship status: Not on file   Other Topics Concern    Are you pregnant or think you may be? No    Breast-feeding No   Social History Narrative    Not on file       Review of Systems:  Respiratory ROS: no cough, shortness of breath, or wheezing  Cardiovascular ROS: no chest pain or dyspnea on exertion  Gastrointestinal ROS: no abdominal pain, change in bowel  habits, or black or bloody stools  Musculoskeletal ROS: negative  Neurological ROS: no TIA or stroke symptoms        Physical Exam:  General: no distress  Head: normocephalic  Neck: supple, symmetrical, trachea midline  Lungs:  clear to auscultation bilaterally and normal respiratory effort  Heart: regular rate and rhythm, S1, S2 normal, no murmur, rub or gallop  Abdomen: soft, non-tender non-distented; bowel sounds normal; no masses,  no organomegaly  Extremities: no cyanosis or edema, or clubbing       Deep Sedation: Mallampati Score II (hard and soft palate, upper portion of tonsils anduvula visible)    II    Assessment and Plan:  Proceed with Colonoscopy

## 2020-01-09 NOTE — TRANSFER OF CARE
"Anesthesia Transfer of Care Note    Patient: Maya Ervin    Procedure(s) Performed: Procedure(s) (LRB):  COLONOSCOPY (N/A)    Patient location: PACU    Anesthesia Type: general    Transport from OR: Transported from OR on room air with adequate spontaneous ventilation    Post pain: adequate analgesia    Post assessment: no apparent anesthetic complications and tolerated procedure well    Post vital signs: stable    Level of consciousness: awake and alert    Nausea/Vomiting: no nausea/vomiting    Complications: none    Transfer of care protocol was followed      Last vitals:   Visit Vitals  /81 (BP Location: Left arm, Patient Position: Sitting)   Pulse 96   Temp 36.8 °C (98.2 °F)   Resp 16   Ht 5' 5" (1.651 m)   Wt 64.9 kg (143 lb)   SpO2 98%   Breastfeeding? No   BMI 23.80 kg/m²     "

## 2020-01-09 NOTE — ANESTHESIA POSTPROCEDURE EVALUATION
Anesthesia Post Evaluation    Patient: Maya Ervin    Procedure(s) Performed: Procedure(s) (LRB):  COLONOSCOPY (N/A)    Final Anesthesia Type: general    Patient location during evaluation: PACU  Patient participation: Yes- Able to Participate  Level of consciousness: awake and alert and oriented  Post-procedure vital signs: reviewed and stable  Pain management: adequate  Airway patency: patent    PONV status at discharge: No PONV  Anesthetic complications: no      Cardiovascular status: hemodynamically stable  Respiratory status: unassisted  Hydration status: euvolemic  Follow-up not needed.          Vitals Value Taken Time   /62 1/9/2020  8:31 AM   Temp 36.7 °C (98.1 °F) 1/9/2020  8:01 AM   Pulse 86 1/9/2020  8:31 AM   Resp 16 1/9/2020  8:31 AM   SpO2 100 % 1/9/2020  8:31 AM         Event Time     Out of Recovery 08:31:34          Pain/Fernanda Score: Fernanda Score: 10 (1/9/2020  8:28 AM)

## 2020-04-27 ENCOUNTER — LAB VISIT (OUTPATIENT)
Dept: LAB | Facility: HOSPITAL | Age: 65
End: 2020-04-27
Attending: INTERNAL MEDICINE
Payer: COMMERCIAL

## 2020-04-27 DIAGNOSIS — E78.5 HYPERLIPIDEMIA, UNSPECIFIED HYPERLIPIDEMIA TYPE: ICD-10-CM

## 2020-04-27 DIAGNOSIS — R79.89 ELEVATED TSH: ICD-10-CM

## 2020-04-27 DIAGNOSIS — Z00.00 WELL ADULT EXAM: ICD-10-CM

## 2020-04-27 DIAGNOSIS — E55.9 VITAMIN D DEFICIENCY: ICD-10-CM

## 2020-04-27 LAB
25(OH)D3+25(OH)D2 SERPL-MCNC: 19 NG/ML (ref 30–96)
ALBUMIN SERPL BCP-MCNC: 4.2 G/DL (ref 3.5–5.2)
ALP SERPL-CCNC: 77 U/L (ref 55–135)
ALT SERPL W/O P-5'-P-CCNC: 24 U/L (ref 10–44)
ANION GAP SERPL CALC-SCNC: 10 MMOL/L (ref 8–16)
AST SERPL-CCNC: 23 U/L (ref 10–40)
BASOPHILS # BLD AUTO: 0.03 K/UL (ref 0–0.2)
BASOPHILS NFR BLD: 0.8 % (ref 0–1.9)
BILIRUB SERPL-MCNC: 0.5 MG/DL (ref 0.1–1)
BUN SERPL-MCNC: 7 MG/DL (ref 8–23)
CALCIUM SERPL-MCNC: 9.6 MG/DL (ref 8.7–10.5)
CHLORIDE SERPL-SCNC: 104 MMOL/L (ref 95–110)
CHOLEST SERPL-MCNC: 253 MG/DL (ref 120–199)
CHOLEST/HDLC SERPL: 4.4 {RATIO} (ref 2–5)
CO2 SERPL-SCNC: 27 MMOL/L (ref 23–29)
CREAT SERPL-MCNC: 0.9 MG/DL (ref 0.5–1.4)
DIFFERENTIAL METHOD: ABNORMAL
EOSINOPHIL # BLD AUTO: 0.1 K/UL (ref 0–0.5)
EOSINOPHIL NFR BLD: 1.3 % (ref 0–8)
ERYTHROCYTE [DISTWIDTH] IN BLOOD BY AUTOMATED COUNT: 12.3 % (ref 11.5–14.5)
EST. GFR  (AFRICAN AMERICAN): >60 ML/MIN/1.73 M^2
EST. GFR  (NON AFRICAN AMERICAN): >60 ML/MIN/1.73 M^2
GLUCOSE SERPL-MCNC: 106 MG/DL (ref 70–110)
HCT VFR BLD AUTO: 43.7 % (ref 37–48.5)
HDLC SERPL-MCNC: 58 MG/DL (ref 40–75)
HDLC SERPL: 22.9 % (ref 20–50)
HGB BLD-MCNC: 13.5 G/DL (ref 12–16)
IMM GRANULOCYTES # BLD AUTO: 0.01 K/UL (ref 0–0.04)
IMM GRANULOCYTES NFR BLD AUTO: 0.3 % (ref 0–0.5)
LDLC SERPL CALC-MCNC: 164.6 MG/DL (ref 63–159)
LYMPHOCYTES # BLD AUTO: 1.5 K/UL (ref 1–4.8)
LYMPHOCYTES NFR BLD: 39 % (ref 18–48)
MCH RBC QN AUTO: 29 PG (ref 27–31)
MCHC RBC AUTO-ENTMCNC: 30.9 G/DL (ref 32–36)
MCV RBC AUTO: 94 FL (ref 82–98)
MONOCYTES # BLD AUTO: 0.3 K/UL (ref 0.3–1)
MONOCYTES NFR BLD: 7.2 % (ref 4–15)
NEUTROPHILS # BLD AUTO: 1.9 K/UL (ref 1.8–7.7)
NEUTROPHILS NFR BLD: 51.4 % (ref 38–73)
NONHDLC SERPL-MCNC: 195 MG/DL
NRBC BLD-RTO: 0 /100 WBC
PLATELET # BLD AUTO: 332 K/UL (ref 150–350)
PMV BLD AUTO: 9.2 FL (ref 9.2–12.9)
POTASSIUM SERPL-SCNC: 4.2 MMOL/L (ref 3.5–5.1)
PROT SERPL-MCNC: 7.9 G/DL (ref 6–8.4)
RBC # BLD AUTO: 4.66 M/UL (ref 4–5.4)
SODIUM SERPL-SCNC: 141 MMOL/L (ref 136–145)
T4 FREE SERPL-MCNC: 0.88 NG/DL (ref 0.71–1.51)
TRIGL SERPL-MCNC: 152 MG/DL (ref 30–150)
TSH SERPL DL<=0.005 MIU/L-ACNC: 8.65 UIU/ML (ref 0.4–4)
WBC # BLD AUTO: 3.77 K/UL (ref 3.9–12.7)

## 2020-04-27 PROCEDURE — 80061 LIPID PANEL: CPT

## 2020-04-27 PROCEDURE — 80053 COMPREHEN METABOLIC PANEL: CPT

## 2020-04-27 PROCEDURE — 84443 ASSAY THYROID STIM HORMONE: CPT

## 2020-04-27 PROCEDURE — 85025 COMPLETE CBC W/AUTO DIFF WBC: CPT

## 2020-04-27 PROCEDURE — 36415 COLL VENOUS BLD VENIPUNCTURE: CPT | Mod: PO

## 2020-04-27 PROCEDURE — 82306 VITAMIN D 25 HYDROXY: CPT

## 2020-04-27 PROCEDURE — 84439 ASSAY OF FREE THYROXINE: CPT

## 2020-05-12 ENCOUNTER — OFFICE VISIT (OUTPATIENT)
Dept: INTERNAL MEDICINE | Facility: CLINIC | Age: 65
End: 2020-05-12
Payer: COMMERCIAL

## 2020-05-12 ENCOUNTER — HOSPITAL ENCOUNTER (OUTPATIENT)
Dept: RADIOLOGY | Facility: HOSPITAL | Age: 65
Discharge: HOME | End: 2020-05-12
Attending: INTERNAL MEDICINE
Payer: COMMERCIAL

## 2020-05-12 VITALS
WEIGHT: 143.94 LBS | OXYGEN SATURATION: 99 % | BODY MASS INDEX: 23.98 KG/M2 | TEMPERATURE: 98 F | SYSTOLIC BLOOD PRESSURE: 106 MMHG | HEART RATE: 78 BPM | DIASTOLIC BLOOD PRESSURE: 74 MMHG | RESPIRATION RATE: 14 BRPM | HEIGHT: 65 IN

## 2020-05-12 DIAGNOSIS — E55.9 VITAMIN D DEFICIENCY: ICD-10-CM

## 2020-05-12 DIAGNOSIS — Z00.00 WELL ADULT EXAM: Primary | ICD-10-CM

## 2020-05-12 DIAGNOSIS — E78.49 OTHER HYPERLIPIDEMIA: ICD-10-CM

## 2020-05-12 DIAGNOSIS — Z12.31 OTHER SCREENING MAMMOGRAM: ICD-10-CM

## 2020-05-12 DIAGNOSIS — R79.89 ELEVATED TSH: ICD-10-CM

## 2020-05-12 DIAGNOSIS — E03.9 ACQUIRED HYPOTHYROIDISM: ICD-10-CM

## 2020-05-12 PROCEDURE — 99999 PR PBB SHADOW E&M-EST. PATIENT-LVL III: CPT | Mod: PBBFAC,,, | Performed by: INTERNAL MEDICINE

## 2020-05-12 PROCEDURE — 77063 MAMMO DIGITAL SCREENING BILAT WITH TOMOSYNTHESIS_CAD: ICD-10-PCS | Mod: 26,,, | Performed by: RADIOLOGY

## 2020-05-12 PROCEDURE — 99999 PR PBB SHADOW E&M-EST. PATIENT-LVL III: ICD-10-PCS | Mod: PBBFAC,,, | Performed by: INTERNAL MEDICINE

## 2020-05-12 PROCEDURE — 77067 SCR MAMMO BI INCL CAD: CPT | Mod: TC,PO

## 2020-05-12 PROCEDURE — 77063 BREAST TOMOSYNTHESIS BI: CPT | Mod: 26,,, | Performed by: RADIOLOGY

## 2020-05-12 PROCEDURE — 77067 SCR MAMMO BI INCL CAD: CPT | Mod: 26,,, | Performed by: RADIOLOGY

## 2020-05-12 PROCEDURE — 77067 MAMMO DIGITAL SCREENING BILAT WITH TOMOSYNTHESIS_CAD: ICD-10-PCS | Mod: 26,,, | Performed by: RADIOLOGY

## 2020-05-12 PROCEDURE — 99396 PREV VISIT EST AGE 40-64: CPT | Mod: S$GLB,,, | Performed by: INTERNAL MEDICINE

## 2020-05-12 PROCEDURE — 99396 PR PREVENTIVE VISIT,EST,40-64: ICD-10-PCS | Mod: S$GLB,,, | Performed by: INTERNAL MEDICINE

## 2020-05-12 RX ORDER — LEVOTHYROXINE SODIUM 25 UG/1
25 TABLET ORAL
Qty: 30 TABLET | Refills: 6 | Status: SHIPPED | OUTPATIENT
Start: 2020-05-12 | End: 2021-08-10

## 2020-05-12 NOTE — PROGRESS NOTES
Subjective:       Patient ID: Maya Ervin is a 64 y.o. female.    Chief Complaint: Annual Exam (Physical w/labs)    HPI   The patient presents for annual physical examination.  Active medical conditions have included hyperlipidemia, vitamin-D deficiency.  The patient states she has been doing well.  She remains physically active.    Immunization record was reviewed.  The patient reports experiencing a rash at the site of the Shingrix shingles injection; symptoms resolved in 3-4 days.    Screening tests reviewed.  The patient had a colonoscopy on 2020.  Follow-up screening examination in 10 years was recommended.    No interval change in past medical history, family history, or social history since prior evaluations.  Family history is significant for her father dying at age 69 of probable cardiovascular disease and her brother  at age 65 of a myocardial infarction.    Review of Systems   Constitutional: Negative for fatigue, fever and unexpected weight change.   HENT: Negative for congestion, postnasal drip, rhinorrhea and sore throat.    Eyes: Negative for visual disturbance.   Respiratory: Negative for cough, chest tightness, shortness of breath and wheezing.    Cardiovascular: Negative for chest pain, palpitations and leg swelling.   Gastrointestinal: Negative for abdominal pain and blood in stool.   Genitourinary: Negative for dysuria, frequency and hematuria.   Musculoskeletal: Negative for arthralgias, back pain, joint swelling and myalgias.   Skin: Negative for rash.   Neurological: Negative for dizziness, syncope, weakness, numbness and headaches.   Psychiatric/Behavioral: Negative for sleep disturbance. The patient is not nervous/anxious.        Objective:      Physical Exam   Constitutional: She is oriented to person, place, and time. Vital signs are normal. She appears well-developed and well-nourished. No distress.   HENT:   Head: Normocephalic and atraumatic.   Right Ear: External ear  normal.   Left Ear: External ear normal.   Nose: Nose normal.   Mouth/Throat: Oropharynx is clear and moist. No oropharyngeal exudate.   Eyes: Pupils are equal, round, and reactive to light. Conjunctivae and EOM are normal. No scleral icterus.   Neck: Normal range of motion. Neck supple. No JVD present. Carotid bruit is not present. No thyromegaly present.   Cardiovascular: Normal rate, regular rhythm, normal heart sounds, intact distal pulses and normal pulses. Exam reveals no gallop and no friction rub.   No murmur heard.  Pulmonary/Chest: Effort normal and breath sounds normal. No respiratory distress. She has no wheezes. She has no rales.   Abdominal: Soft. Bowel sounds are normal. She exhibits no abdominal bruit and no mass. There is no splenomegaly or hepatomegaly. There is no tenderness. No hernia.   Musculoskeletal: Normal range of motion. She exhibits no edema or tenderness.        Right shoulder: She exhibits no effusion and no deformity.   Lymphadenopathy:     She has no cervical adenopathy.     She has no axillary adenopathy.        Right: No supraclavicular adenopathy present.        Left: No supraclavicular adenopathy present.   Neurological: She is alert and oriented to person, place, and time. She has normal strength. No cranial nerve deficit.   Skin: Skin is warm and dry. No rash noted.   Psychiatric: She has a normal mood and affect. Her speech is normal and behavior is normal.   Nursing note and vitals reviewed.      Results for orders placed or performed in visit on 04/27/20   CBC auto differential   Result Value Ref Range    WBC 3.77 (L) 3.90 - 12.70 K/uL    RBC 4.66 4.00 - 5.40 M/uL    Hemoglobin 13.5 12.0 - 16.0 g/dL    Hematocrit 43.7 37.0 - 48.5 %    Mean Corpuscular Volume 94 82 - 98 fL    Mean Corpuscular Hemoglobin 29.0 27.0 - 31.0 pg    Mean Corpuscular Hemoglobin Conc 30.9 (L) 32.0 - 36.0 g/dL    RDW 12.3 11.5 - 14.5 %    Platelets 332 150 - 350 K/uL    MPV 9.2 9.2 - 12.9 fL    Immature  Granulocytes 0.3 0.0 - 0.5 %    Gran # (ANC) 1.9 1.8 - 7.7 K/uL    Immature Grans (Abs) 0.01 0.00 - 0.04 K/uL    Lymph # 1.5 1.0 - 4.8 K/uL    Mono # 0.3 0.3 - 1.0 K/uL    Eos # 0.1 0.0 - 0.5 K/uL    Baso # 0.03 0.00 - 0.20 K/uL    nRBC 0 0 /100 WBC    Gran% 51.4 38.0 - 73.0 %    Lymph% 39.0 18.0 - 48.0 %    Mono% 7.2 4.0 - 15.0 %    Eosinophil% 1.3 0.0 - 8.0 %    Basophil% 0.8 0.0 - 1.9 %    Differential Method Automated    Comprehensive metabolic panel   Result Value Ref Range    Sodium 141 136 - 145 mmol/L    Potassium 4.2 3.5 - 5.1 mmol/L    Chloride 104 95 - 110 mmol/L    CO2 27 23 - 29 mmol/L    Glucose 106 70 - 110 mg/dL    BUN, Bld 7 (L) 8 - 23 mg/dL    Creatinine 0.9 0.5 - 1.4 mg/dL    Calcium 9.6 8.7 - 10.5 mg/dL    Total Protein 7.9 6.0 - 8.4 g/dL    Albumin 4.2 3.5 - 5.2 g/dL    Total Bilirubin 0.5 0.1 - 1.0 mg/dL    Alkaline Phosphatase 77 55 - 135 U/L    AST 23 10 - 40 U/L    ALT 24 10 - 44 U/L    Anion Gap 10 8 - 16 mmol/L    eGFR if African American >60.0 >60 mL/min/1.73 m^2    eGFR if non African American >60.0 >60 mL/min/1.73 m^2   Lipid panel   Result Value Ref Range    Cholesterol 253 (H) 120 - 199 mg/dL    Triglycerides 152 (H) 30 - 150 mg/dL    HDL 58 40 - 75 mg/dL    LDL Cholesterol 164.6 (H) 63.0 - 159.0 mg/dL    Hdl/Cholesterol Ratio 22.9 20.0 - 50.0 %    Total Cholesterol/HDL Ratio 4.4 2.0 - 5.0    Non-HDL Cholesterol 195 mg/dL   TSH   Result Value Ref Range    TSH 8.651 (H) 0.400 - 4.000 uIU/mL   Vitamin D   Result Value Ref Range    Vit D, 25-Hydroxy 19 (L) 30 - 96 ng/mL   T4, free   Result Value Ref Range    Free T4 0.88 0.71 - 1.51 ng/dL       Assessment:       1. Well adult exam    2. Other hyperlipidemia    3. Vitamin D deficiency    4. Elevated TSH    5. Acquired hypothyroidism        Plan:     Maya was seen today for annual exam.  The patient was advised to take vitamin-D supplement daily.  Synthroid therapy will be initiated at a low dose.  The patient was encouraged to increase  her exercise level.  We discussed coronary artery calcium screening to further assess her own cardiovascular risk in view of her family history.  The patient declines at this time.  Blood tests will be repeated in 6 months along with a follow-up visit.    Diagnoses and all orders for this visit:    Well adult exam    Other hyperlipidemia  -     TSH; Future  -     Lipid Panel; Future  -     Comprehensive metabolic panel; Future  -     CBC auto differential; Future  -     T4, free; Future  -     Vitamin D; Future    Vitamin D deficiency  -     TSH; Future  -     Lipid Panel; Future  -     Comprehensive metabolic panel; Future  -     CBC auto differential; Future  -     T4, free; Future  -     Vitamin D; Future    Elevated TSH    Acquired hypothyroidism  -     TSH; Future  -     Lipid Panel; Future  -     Comprehensive metabolic panel; Future  -     CBC auto differential; Future  -     T4, free; Future  -     Vitamin D; Future    Other orders  -     levothyroxine (SYNTHROID) 25 MCG tablet; Take 1 tablet (25 mcg total) by mouth before breakfast. For thyroid.

## 2020-07-08 ENCOUNTER — TELEPHONE (OUTPATIENT)
Dept: INTERNAL MEDICINE | Facility: CLINIC | Age: 65
End: 2020-07-08

## 2020-07-08 NOTE — TELEPHONE ENCOUNTER
I told her not a problem.  Let us know if any other labs are a problem.   We can always send her to outside lab if need.

## 2020-07-08 NOTE — TELEPHONE ENCOUNTER
----- Message from Suzi Cordoba sent at 7/8/2020  2:52 PM CDT -----  Regarding: Vitamin D as of Jan 2020  Needs Advice    Reason for call:      Pt wants to let the provider know that [facility] does not longer pay for Vitamin D lab work. She was billed for it when she came in April and just wants to make sure she does not have to have it again.    Communication Preference: Maya [phone]    Additional Information:    Please call back to advise

## 2020-12-08 ENCOUNTER — TELEPHONE (OUTPATIENT)
Dept: INTERNAL MEDICINE | Facility: CLINIC | Age: 65
End: 2020-12-08

## 2020-12-08 DIAGNOSIS — K13.70 ORAL LESION: Primary | ICD-10-CM

## 2020-12-08 NOTE — TELEPHONE ENCOUNTER
Has noticed bump for about 2 months.  Bump at bottom oral cavity. Below tongue.  No pain.  Looks like a pimple. Started small  And has enlarged some. a Little white color.     Tried gargle with warm salt and decreases some.  Also tried baking soda on it and helps too. But doesn't go away.  Changed tooth brush and paste and has had no affect.    Please advise who she needs to see.    Thanks lola

## 2020-12-08 NOTE — TELEPHONE ENCOUNTER
----- Message from Luis Farfan sent at 12/8/2020 10:21 AM CST -----  Regarding: self [phone]  Would like to get medical advice.  Symptoms (please be specific):  bump inside bottom mouth   How long has patient had these symptoms:  over a month   Pharmacy name and phone # (copy from chart):  [facility] #[id number] [address] AT [address] [phone] (Phone)  [phone] (Fax)

## 2021-03-02 ENCOUNTER — PATIENT OUTREACH (OUTPATIENT)
Dept: ADMINISTRATIVE | Facility: HOSPITAL | Age: 66
End: 2021-03-02

## 2021-05-12 ENCOUNTER — TELEPHONE (OUTPATIENT)
Dept: INTERNAL MEDICINE | Facility: CLINIC | Age: 66
End: 2021-05-12

## 2021-05-12 DIAGNOSIS — Z00.00 WELL ADULT EXAM: Primary | ICD-10-CM

## 2021-05-27 DIAGNOSIS — Z12.31 OTHER SCREENING MAMMOGRAM: ICD-10-CM

## 2021-06-10 ENCOUNTER — HOSPITAL ENCOUNTER (OUTPATIENT)
Dept: RADIOLOGY | Facility: HOSPITAL | Age: 66
Discharge: HOME | End: 2021-06-10
Attending: INTERNAL MEDICINE
Payer: COMMERCIAL

## 2021-06-10 DIAGNOSIS — Z12.31 OTHER SCREENING MAMMOGRAM: ICD-10-CM

## 2021-06-10 PROCEDURE — 77067 SCR MAMMO BI INCL CAD: CPT | Mod: TC,PO

## 2021-06-10 PROCEDURE — 77067 SCR MAMMO BI INCL CAD: CPT | Mod: 26,,, | Performed by: RADIOLOGY

## 2021-06-10 PROCEDURE — 77063 BREAST TOMOSYNTHESIS BI: CPT | Mod: 26,,, | Performed by: RADIOLOGY

## 2021-06-10 PROCEDURE — 77063 MAMMO DIGITAL SCREENING BILAT WITH TOMO: ICD-10-PCS | Mod: 26,,, | Performed by: RADIOLOGY

## 2021-06-10 PROCEDURE — 77067 MAMMO DIGITAL SCREENING BILAT WITH TOMO: ICD-10-PCS | Mod: 26,,, | Performed by: RADIOLOGY

## 2021-08-10 ENCOUNTER — OFFICE VISIT (OUTPATIENT)
Dept: OBSTETRICS AND GYNECOLOGY | Facility: CLINIC | Age: 66
End: 2021-08-10
Attending: OBSTETRICS & GYNECOLOGY
Payer: COMMERCIAL

## 2021-08-10 VITALS
DIASTOLIC BLOOD PRESSURE: 84 MMHG | HEIGHT: 65 IN | WEIGHT: 146.5 LBS | SYSTOLIC BLOOD PRESSURE: 128 MMHG | BODY MASS INDEX: 24.41 KG/M2

## 2021-08-10 DIAGNOSIS — Z11.51 ENCOUNTER FOR SCREENING FOR HUMAN PAPILLOMAVIRUS (HPV): ICD-10-CM

## 2021-08-10 DIAGNOSIS — Z12.4 ENCOUNTER FOR SCREENING FOR CERVICAL CANCER: ICD-10-CM

## 2021-08-10 DIAGNOSIS — Z01.419 ENCOUNTER FOR GYNECOLOGICAL EXAMINATION: Primary | ICD-10-CM

## 2021-08-10 PROCEDURE — 87624 HPV HI-RISK TYP POOLED RSLT: CPT | Performed by: OBSTETRICS & GYNECOLOGY

## 2021-08-10 PROCEDURE — 1160F PR REVIEW ALL MEDS BY PRESCRIBER/CLIN PHARMACIST DOCUMENTED: ICD-10-PCS | Mod: CPTII,S$GLB,, | Performed by: OBSTETRICS & GYNECOLOGY

## 2021-08-10 PROCEDURE — 88175 CYTOPATH C/V AUTO FLUID REDO: CPT | Performed by: OBSTETRICS & GYNECOLOGY

## 2021-08-10 PROCEDURE — 99386 PR PREVENTIVE VISIT,NEW,40-64: ICD-10-PCS | Mod: S$GLB,,, | Performed by: OBSTETRICS & GYNECOLOGY

## 2021-08-10 PROCEDURE — 1160F RVW MEDS BY RX/DR IN RCRD: CPT | Mod: CPTII,S$GLB,, | Performed by: OBSTETRICS & GYNECOLOGY

## 2021-08-10 PROCEDURE — 3079F PR MOST RECENT DIASTOLIC BLOOD PRESSURE 80-89 MM HG: ICD-10-PCS | Mod: CPTII,S$GLB,, | Performed by: OBSTETRICS & GYNECOLOGY

## 2021-08-10 PROCEDURE — 3074F PR MOST RECENT SYSTOLIC BLOOD PRESSURE < 130 MM HG: ICD-10-PCS | Mod: CPTII,S$GLB,, | Performed by: OBSTETRICS & GYNECOLOGY

## 2021-08-10 PROCEDURE — 3079F DIAST BP 80-89 MM HG: CPT | Mod: CPTII,S$GLB,, | Performed by: OBSTETRICS & GYNECOLOGY

## 2021-08-10 PROCEDURE — 99386 PREV VISIT NEW AGE 40-64: CPT | Mod: S$GLB,,, | Performed by: OBSTETRICS & GYNECOLOGY

## 2021-08-10 PROCEDURE — 1126F PR PAIN SEVERITY QUANTIFIED, NO PAIN PRESENT: ICD-10-PCS | Mod: CPTII,S$GLB,, | Performed by: OBSTETRICS & GYNECOLOGY

## 2021-08-10 PROCEDURE — 99999 PR PBB SHADOW E&M-EST. PATIENT-LVL III: CPT | Mod: PBBFAC,,, | Performed by: OBSTETRICS & GYNECOLOGY

## 2021-08-10 PROCEDURE — 3008F BODY MASS INDEX DOCD: CPT | Mod: CPTII,S$GLB,, | Performed by: OBSTETRICS & GYNECOLOGY

## 2021-08-10 PROCEDURE — 1159F MED LIST DOCD IN RCRD: CPT | Mod: CPTII,S$GLB,, | Performed by: OBSTETRICS & GYNECOLOGY

## 2021-08-10 PROCEDURE — 1159F PR MEDICATION LIST DOCUMENTED IN MEDICAL RECORD: ICD-10-PCS | Mod: CPTII,S$GLB,, | Performed by: OBSTETRICS & GYNECOLOGY

## 2021-08-10 PROCEDURE — 99999 PR PBB SHADOW E&M-EST. PATIENT-LVL III: ICD-10-PCS | Mod: PBBFAC,,, | Performed by: OBSTETRICS & GYNECOLOGY

## 2021-08-10 PROCEDURE — 3008F PR BODY MASS INDEX (BMI) DOCUMENTED: ICD-10-PCS | Mod: CPTII,S$GLB,, | Performed by: OBSTETRICS & GYNECOLOGY

## 2021-08-10 PROCEDURE — 3074F SYST BP LT 130 MM HG: CPT | Mod: CPTII,S$GLB,, | Performed by: OBSTETRICS & GYNECOLOGY

## 2021-08-10 PROCEDURE — 1126F AMNT PAIN NOTED NONE PRSNT: CPT | Mod: CPTII,S$GLB,, | Performed by: OBSTETRICS & GYNECOLOGY

## 2021-08-12 LAB
FINAL PATHOLOGIC DIAGNOSIS: NORMAL
Lab: NORMAL

## 2021-08-17 LAB
HPV HR 12 DNA SPEC QL NAA+PROBE: NEGATIVE
HPV16 AG SPEC QL: NEGATIVE
HPV18 DNA SPEC QL NAA+PROBE: NEGATIVE

## 2021-09-13 ENCOUNTER — TELEPHONE (OUTPATIENT)
Dept: PRIMARY CARE CLINIC | Facility: CLINIC | Age: 66
End: 2021-09-13

## 2021-10-04 ENCOUNTER — PATIENT MESSAGE (OUTPATIENT)
Dept: ADMINISTRATIVE | Facility: HOSPITAL | Age: 66
End: 2021-10-04

## 2022-01-25 ENCOUNTER — PATIENT MESSAGE (OUTPATIENT)
Dept: ADMINISTRATIVE | Facility: HOSPITAL | Age: 67
End: 2022-01-25
Payer: COMMERCIAL

## 2022-03-16 ENCOUNTER — PATIENT MESSAGE (OUTPATIENT)
Dept: ADMINISTRATIVE | Facility: HOSPITAL | Age: 67
End: 2022-03-16
Payer: COMMERCIAL

## 2022-03-29 ENCOUNTER — TELEPHONE (OUTPATIENT)
Dept: INTERNAL MEDICINE | Facility: CLINIC | Age: 67
End: 2022-03-29
Payer: COMMERCIAL

## 2022-03-29 DIAGNOSIS — Z00.00 WELL ADULT EXAM: Primary | ICD-10-CM

## 2022-03-29 DIAGNOSIS — E55.9 VITAMIN D DEFICIENCY: ICD-10-CM

## 2022-03-29 NOTE — TELEPHONE ENCOUNTER
----- Message from Makayla Chavez sent at 3/29/2022 12:31 PM CDT -----  Contact: Pt [phone]  Patient has her Annual scheduled for 7/25/2022 and is requesting her lab orders be sent to [facility] on [address] close to [place].      Please advise the patient of the date for the labs.    Please call and advise.    Thank You

## 2022-06-17 ENCOUNTER — TELEPHONE (OUTPATIENT)
Dept: PRIMARY CARE CLINIC | Facility: CLINIC | Age: 67
End: 2022-06-17
Payer: COMMERCIAL

## 2022-06-17 DIAGNOSIS — Z12.31 SCREENING MAMMOGRAM FOR BREAST CANCER: Primary | ICD-10-CM

## 2022-06-17 NOTE — TELEPHONE ENCOUNTER
----- Message from Marco A Zamarripa sent at 6/17/2022  9:17 AM CDT -----  Contact: pt [phone]  Caller is requesting to schedule their annual screening mammogram appointment. Order is not listed in Epic.  Please enter order and contact patient to schedule.  Would the patient like a call back, or a response through their [facility] portal?:   call

## 2022-07-10 LAB
25(OH)D3 SERPL-MCNC: 17 NG/ML (ref 30–100)
ALBUMIN SERPL-MCNC: 4.5 G/DL (ref 3.6–5.1)
ALBUMIN/GLOB SERPL: 1.5 (CALC) (ref 1–2.5)
ALP SERPL-CCNC: 56 U/L (ref 37–153)
ALT SERPL-CCNC: 21 U/L (ref 6–29)
APPEARANCE UR: CLEAR
AST SERPL-CCNC: 23 U/L (ref 10–35)
BASOPHILS # BLD AUTO: 30 CELLS/UL (ref 0–200)
BASOPHILS NFR BLD AUTO: 0.8 %
BILIRUB SERPL-MCNC: 0.5 MG/DL (ref 0.2–1.2)
BILIRUB UR QL STRIP: NEGATIVE
BUN SERPL-MCNC: 11 MG/DL (ref 7–25)
BUN/CREAT SERPL: ABNORMAL (CALC) (ref 6–22)
CALCIUM SERPL-MCNC: 9.4 MG/DL (ref 8.6–10.4)
CHLORIDE SERPL-SCNC: 102 MMOL/L (ref 98–110)
CHOLEST SERPL-MCNC: 243 MG/DL
CHOLEST/HDLC SERPL: 3.5 (CALC)
CO2 SERPL-SCNC: 29 MMOL/L (ref 20–32)
COLOR UR: YELLOW
CREAT SERPL-MCNC: 0.78 MG/DL (ref 0.5–0.99)
EOSINOPHIL # BLD AUTO: 19 CELLS/UL (ref 15–500)
EOSINOPHIL NFR BLD AUTO: 0.5 %
ERYTHROCYTE [DISTWIDTH] IN BLOOD BY AUTOMATED COUNT: 12 % (ref 11–15)
GLOBULIN SER CALC-MCNC: 3 G/DL (CALC) (ref 1.9–3.7)
GLUCOSE SERPL-MCNC: 104 MG/DL (ref 65–99)
GLUCOSE UR QL STRIP: NEGATIVE
HCT VFR BLD AUTO: 39.8 % (ref 35–45)
HDLC SERPL-MCNC: 70 MG/DL
HGB BLD-MCNC: 12.7 G/DL (ref 11.7–15.5)
HGB UR QL STRIP: NEGATIVE
KETONES UR QL STRIP: NEGATIVE
LDLC SERPL CALC-MCNC: 153 MG/DL (CALC)
LEUKOCYTE ESTERASE UR QL STRIP: NEGATIVE
LYMPHOCYTES # BLD AUTO: 1060 CELLS/UL (ref 850–3900)
LYMPHOCYTES NFR BLD AUTO: 27.9 %
MCH RBC QN AUTO: 29.2 PG (ref 27–33)
MCHC RBC AUTO-ENTMCNC: 31.9 G/DL (ref 32–36)
MCV RBC AUTO: 91.5 FL (ref 80–100)
MONOCYTES # BLD AUTO: 213 CELLS/UL (ref 200–950)
MONOCYTES NFR BLD AUTO: 5.6 %
NEUTROPHILS # BLD AUTO: 2478 CELLS/UL (ref 1500–7800)
NEUTROPHILS NFR BLD AUTO: 65.2 %
NITRITE UR QL STRIP: NEGATIVE
NONHDLC SERPL-MCNC: 173 MG/DL (CALC)
PH UR STRIP: 5.5 [PH] (ref 5–8)
PLATELET # BLD AUTO: 294 THOUSAND/UL (ref 140–400)
PMV BLD REES-ECKER: 9.3 FL (ref 7.5–12.5)
POTASSIUM SERPL-SCNC: 4.3 MMOL/L (ref 3.5–5.3)
PROT SERPL-MCNC: 7.5 G/DL (ref 6.1–8.1)
PROT UR QL STRIP: NEGATIVE
RBC # BLD AUTO: 4.35 MILLION/UL (ref 3.8–5.1)
SODIUM SERPL-SCNC: 139 MMOL/L (ref 135–146)
SP GR UR STRIP: 1 (ref 1–1.03)
T4 FREE SERPL-MCNC: 1 NG/DL (ref 0.8–1.8)
TRIGL SERPL-MCNC: 93 MG/DL
TSH SERPL-ACNC: 8.19 MIU/L (ref 0.4–4.5)
WBC # BLD AUTO: 3.8 THOUSAND/UL (ref 3.8–10.8)

## 2022-07-25 ENCOUNTER — OFFICE VISIT (OUTPATIENT)
Dept: INTERNAL MEDICINE | Facility: CLINIC | Age: 67
End: 2022-07-25
Payer: COMMERCIAL

## 2022-07-25 ENCOUNTER — HOSPITAL ENCOUNTER (OUTPATIENT)
Dept: RADIOLOGY | Facility: HOSPITAL | Age: 67
Discharge: HOME | End: 2022-07-25
Attending: INTERNAL MEDICINE
Payer: COMMERCIAL

## 2022-07-25 VITALS
OXYGEN SATURATION: 98 % | RESPIRATION RATE: 16 BRPM | HEIGHT: 66 IN | TEMPERATURE: 98 F | HEIGHT: 66 IN | WEIGHT: 145 LBS | BODY MASS INDEX: 23.38 KG/M2 | SYSTOLIC BLOOD PRESSURE: 120 MMHG | DIASTOLIC BLOOD PRESSURE: 86 MMHG | BODY MASS INDEX: 23.3 KG/M2 | WEIGHT: 145.5 LBS | HEART RATE: 76 BPM

## 2022-07-25 DIAGNOSIS — Z78.0 POSTMENOPAUSAL: ICD-10-CM

## 2022-07-25 DIAGNOSIS — Z12.31 SCREENING MAMMOGRAM FOR BREAST CANCER: ICD-10-CM

## 2022-07-25 DIAGNOSIS — E78.49 OTHER HYPERLIPIDEMIA: ICD-10-CM

## 2022-07-25 DIAGNOSIS — E03.9 ACQUIRED HYPOTHYROIDISM: Chronic | ICD-10-CM

## 2022-07-25 DIAGNOSIS — Z00.00 WELL ADULT EXAM: Primary | ICD-10-CM

## 2022-07-25 DIAGNOSIS — E55.9 VITAMIN D DEFICIENCY: ICD-10-CM

## 2022-07-25 PROCEDURE — 3008F PR BODY MASS INDEX (BMI) DOCUMENTED: ICD-10-PCS | Mod: CPTII,S$GLB,, | Performed by: INTERNAL MEDICINE

## 2022-07-25 PROCEDURE — 99999 PR PBB SHADOW E&M-EST. PATIENT-LVL V: CPT | Mod: PBBFAC,,, | Performed by: INTERNAL MEDICINE

## 2022-07-25 PROCEDURE — 1101F PR PT FALLS ASSESS DOC 0-1 FALLS W/OUT INJ PAST YR: ICD-10-PCS | Mod: CPTII,S$GLB,, | Performed by: INTERNAL MEDICINE

## 2022-07-25 PROCEDURE — 3288F FALL RISK ASSESSMENT DOCD: CPT | Mod: CPTII,S$GLB,, | Performed by: INTERNAL MEDICINE

## 2022-07-25 PROCEDURE — 3074F SYST BP LT 130 MM HG: CPT | Mod: CPTII,S$GLB,, | Performed by: INTERNAL MEDICINE

## 2022-07-25 PROCEDURE — 77067 SCR MAMMO BI INCL CAD: CPT | Mod: 26,,, | Performed by: RADIOLOGY

## 2022-07-25 PROCEDURE — 99999 PR PBB SHADOW E&M-EST. PATIENT-LVL V: ICD-10-PCS | Mod: PBBFAC,,, | Performed by: INTERNAL MEDICINE

## 2022-07-25 PROCEDURE — 1101F PT FALLS ASSESS-DOCD LE1/YR: CPT | Mod: CPTII,S$GLB,, | Performed by: INTERNAL MEDICINE

## 2022-07-25 PROCEDURE — 77063 BREAST TOMOSYNTHESIS BI: CPT | Mod: 26,,, | Performed by: RADIOLOGY

## 2022-07-25 PROCEDURE — 99397 PER PM REEVAL EST PAT 65+ YR: CPT | Mod: 25,S$GLB,, | Performed by: INTERNAL MEDICINE

## 2022-07-25 PROCEDURE — 3074F PR MOST RECENT SYSTOLIC BLOOD PRESSURE < 130 MM HG: ICD-10-PCS | Mod: CPTII,S$GLB,, | Performed by: INTERNAL MEDICINE

## 2022-07-25 PROCEDURE — 99397 PR PREVENTIVE VISIT,EST,65 & OVER: ICD-10-PCS | Mod: 25,S$GLB,, | Performed by: INTERNAL MEDICINE

## 2022-07-25 PROCEDURE — 3288F PR FALLS RISK ASSESSMENT DOCUMENTED: ICD-10-PCS | Mod: CPTII,S$GLB,, | Performed by: INTERNAL MEDICINE

## 2022-07-25 PROCEDURE — 3079F DIAST BP 80-89 MM HG: CPT | Mod: CPTII,S$GLB,, | Performed by: INTERNAL MEDICINE

## 2022-07-25 PROCEDURE — 90677 PNEUMOCOCCAL CONJUGATE VACCINE 20-VALENT: ICD-10-PCS | Mod: S$GLB,,, | Performed by: INTERNAL MEDICINE

## 2022-07-25 PROCEDURE — 77067 SCR MAMMO BI INCL CAD: CPT | Mod: TC,PO

## 2022-07-25 PROCEDURE — 77067 MAMMO DIGITAL SCREENING BILAT WITH TOMO: ICD-10-PCS | Mod: 26,,, | Performed by: RADIOLOGY

## 2022-07-25 PROCEDURE — 1159F MED LIST DOCD IN RCRD: CPT | Mod: CPTII,S$GLB,, | Performed by: INTERNAL MEDICINE

## 2022-07-25 PROCEDURE — 1159F PR MEDICATION LIST DOCUMENTED IN MEDICAL RECORD: ICD-10-PCS | Mod: CPTII,S$GLB,, | Performed by: INTERNAL MEDICINE

## 2022-07-25 PROCEDURE — 77063 MAMMO DIGITAL SCREENING BILAT WITH TOMO: ICD-10-PCS | Mod: 26,,, | Performed by: RADIOLOGY

## 2022-07-25 PROCEDURE — 90471 IMMUNIZATION ADMIN: CPT | Mod: S$GLB,,, | Performed by: INTERNAL MEDICINE

## 2022-07-25 PROCEDURE — 90677 PCV20 VACCINE IM: CPT | Mod: S$GLB,,, | Performed by: INTERNAL MEDICINE

## 2022-07-25 PROCEDURE — 3079F PR MOST RECENT DIASTOLIC BLOOD PRESSURE 80-89 MM HG: ICD-10-PCS | Mod: CPTII,S$GLB,, | Performed by: INTERNAL MEDICINE

## 2022-07-25 PROCEDURE — 90471 PNEUMOCOCCAL CONJUGATE VACCINE 20-VALENT: ICD-10-PCS | Mod: S$GLB,,, | Performed by: INTERNAL MEDICINE

## 2022-07-25 PROCEDURE — 3008F BODY MASS INDEX DOCD: CPT | Mod: CPTII,S$GLB,, | Performed by: INTERNAL MEDICINE

## 2022-07-25 RX ORDER — LEVOTHYROXINE SODIUM 50 UG/1
50 TABLET ORAL
Qty: 90 TABLET | Refills: 3 | Status: SHIPPED | OUTPATIENT
Start: 2022-07-25 | End: 2023-07-20

## 2022-07-26 ENCOUNTER — TELEPHONE (OUTPATIENT)
Dept: INTERNAL MEDICINE | Facility: CLINIC | Age: 67
End: 2022-07-26

## 2022-07-26 ENCOUNTER — TELEPHONE (OUTPATIENT)
Dept: INTERNAL MEDICINE | Facility: CLINIC | Age: 67
End: 2022-07-26
Payer: COMMERCIAL

## 2022-07-26 NOTE — TELEPHONE ENCOUNTER
----- Message from Makayla Chavez sent at 7/26/2022  1:54 PM CDT -----  Contact: Pt [phone]  Patient request that you send her lab orders (9/26/2022) to [facility]     Please call and advise.    Thank You

## 2022-07-26 NOTE — TELEPHONE ENCOUNTER
----- Message from Makayla Chavez sent at 7/26/2022  1:54 PM CDT -----  Contact: Pt [phone]  Patient request that you send her lab orders (9/26/2022) to [facility]     Please call and advise.    Thank You

## 2022-07-26 NOTE — TELEPHONE ENCOUNTER
Orders [facility] to [facility] lab cancelled.    ALIYAOV

## 2022-08-11 ENCOUNTER — PATIENT OUTREACH (OUTPATIENT)
Dept: ADMINISTRATIVE | Facility: HOSPITAL | Age: 67
End: 2022-08-11
Payer: COMMERCIAL

## 2022-08-12 ENCOUNTER — APPOINTMENT (OUTPATIENT)
Dept: RADIOLOGY | Facility: CLINIC | Age: 67
End: 2022-08-12
Attending: INTERNAL MEDICINE
Payer: COMMERCIAL

## 2022-08-12 DIAGNOSIS — Z78.0 POSTMENOPAUSAL: ICD-10-CM

## 2022-08-12 PROCEDURE — 77080 DXA BONE DENSITY AXIAL: CPT | Mod: TC,PO

## 2022-08-12 PROCEDURE — 77080 DXA BONE DENSITY AXIAL: CPT | Mod: 26,,, | Performed by: INTERNAL MEDICINE

## 2022-08-12 PROCEDURE — 77080 DEXA BONE DENSITY SPINE HIP: ICD-10-PCS | Mod: 26,,, | Performed by: INTERNAL MEDICINE

## 2022-08-31 NOTE — PROGRESS NOTES
Subjective:       Patient ID: Maya Ervin is a 67 y.o. female.    Chief Complaint: Annual Exam and Follow-up (6-month)    HPI  The patient presents for annual physical examination.  The patient reports she is doing well.  She stays active.  She is exercising every morning.  She walks for 30 minutes a day.  The patient reports that she never started levothyroxine.    Active medical conditions include hypothyroidism, hyperlipidemia, vitamin-D deficiency.    Immunization record was reviewed.  We discussed obtaining the pneumonia vaccine.    Screening tests were reviewed.  The patient is due for bone density study.    No interval change past medical history or social history since prior evaluations..    Updated family history:  There is no family history of breast, colon, or ovarian cancer.  Positive history of coronary artery disease in her father and brother both of whom  of myocardial infarctions at age is 69 and 66 respectively.    She has a brother with hypertension.  Hyperlipidemia was present in her father and sister.  No known family history of diabetes mellitus.    Mother  of bladder cancer at age 91.  A sister  of liver and lung cancer at age 79.  Another sister  of throat cancer at age 66.     Review of Systems   Constitutional:  Negative for fatigue, fever and unexpected weight change.   HENT:  Negative for nasal congestion, postnasal drip, rhinorrhea and sore throat.    Eyes:  Negative for visual disturbance.   Respiratory:  Negative for cough, chest tightness, shortness of breath and wheezing.    Cardiovascular:  Negative for chest pain, palpitations and leg swelling.   Gastrointestinal:  Negative for abdominal pain and blood in stool.   Genitourinary:  Negative for dysuria, frequency and hematuria.   Musculoskeletal:  Negative for arthralgias, back pain, joint swelling and myalgias.   Integumentary:  Negative for rash.   Neurological:  Negative for dizziness, syncope, weakness,  numbness and headaches.   Psychiatric/Behavioral:  Negative for sleep disturbance. The patient is not nervous/anxious.           Physical Exam  Vitals and nursing note reviewed.   Constitutional:       General: She is not in acute distress.     Appearance: Normal appearance. She is well-developed.   HENT:      Head: Normocephalic and atraumatic.      Right Ear: External ear normal.      Left Ear: External ear normal.      Nose: Nose normal.      Mouth/Throat:      Mouth: Mucous membranes are moist.      Pharynx: Oropharynx is clear. No oropharyngeal exudate.   Eyes:      General: No scleral icterus.     Extraocular Movements: Extraocular movements intact.      Conjunctiva/sclera: Conjunctivae normal.   Neck:      Thyroid: No thyromegaly.      Vascular: No carotid bruit or JVD.   Cardiovascular:      Rate and Rhythm: Normal rate and regular rhythm.      Pulses: Normal pulses.      Heart sounds: Normal heart sounds. No murmur heard.    No friction rub. No gallop.   Pulmonary:      Effort: Pulmonary effort is normal. No respiratory distress.      Breath sounds: Normal breath sounds. No wheezing or rales.   Abdominal:      General: Bowel sounds are normal. There is no abdominal bruit.      Palpations: Abdomen is soft. There is no hepatomegaly, splenomegaly or mass.      Tenderness: There is no abdominal tenderness.      Hernia: No hernia is present.   Musculoskeletal:         General: No tenderness. Normal range of motion.      Right shoulder: No deformity or effusion.      Cervical back: Normal range of motion and neck supple.      Right lower leg: No edema.      Left lower leg: No edema.   Lymphadenopathy:      Cervical: No cervical adenopathy.      Upper Body:      Right upper body: No supraclavicular adenopathy.      Left upper body: No supraclavicular adenopathy.   Skin:     General: Skin is warm and dry.      Findings: No rash.   Neurological:      Mental Status: She is alert and oriented to person, place, and  time.      Cranial Nerves: No cranial nerve deficit.   Psychiatric:         Mood and Affect: Mood normal.         Speech: Speech normal.         Behavior: Behavior normal.         No visits with results within 3 Month(s) from this visit.   Latest known visit with results is:   Telephone on 03/29/2022   Component Date Value Ref Range Status    Glucose 07/09/2022 104 (H)  65 - 99 mg/dL Final    Comment:               Fasting reference interval     For someone without known diabetes, a glucose value  between 100 and 125 mg/dL is consistent with  prediabetes and should be confirmed with a  follow-up test.         BUN 07/09/2022 11  7 - 25 mg/dL Final    Creatinine 07/09/2022 0.78  0.50 - 0.99 mg/dL Final    Comment: For patients >49 years of age, the reference limit  for Creatinine is approximately 13% higher for people  identified as -American.         eGFR if non African American 07/09/2022 80  > OR = 60 mL/min/1.73m2 Final    eGFR if  07/09/2022 93  > OR = 60 mL/min/1.73m2 Final    BUN/Creatinine Ratio 07/09/2022 NOT APPLICABLE  6 - 22 (calc) Final    Sodium 07/09/2022 139  135 - 146 mmol/L Final    Potassium 07/09/2022 4.3  3.5 - 5.3 mmol/L Final    Chloride 07/09/2022 102  98 - 110 mmol/L Final    CO2 07/09/2022 29  20 - 32 mmol/L Final    Calcium 07/09/2022 9.4  8.6 - 10.4 mg/dL Final    Total Protein 07/09/2022 7.5  6.1 - 8.1 g/dL Final    Albumin 07/09/2022 4.5  3.6 - 5.1 g/dL Final    Globulin, Total 07/09/2022 3.0  1.9 - 3.7 g/dL (calc) Final    Albumin/Globulin Ratio 07/09/2022 1.5  1.0 - 2.5 (calc) Final    Total Bilirubin 07/09/2022 0.5  0.2 - 1.2 mg/dL Final    Alkaline Phosphatase 07/09/2022 56  37 - 153 U/L Final    AST 07/09/2022 23  10 - 35 U/L Final    ALT 07/09/2022 21  6 - 29 U/L Final    Cholesterol 07/09/2022 243 (H)  <200 mg/dL Final    HDL 07/09/2022 70  > OR = 50 mg/dL Final    Triglycerides 07/09/2022 93  <150 mg/dL Final    LDL Cholesterol 07/09/2022 153 (H)  mg/dL  (calc) Final    Comment: Reference range: <100     Desirable range <100 mg/dL for primary prevention;    <70 mg/dL for patients with CHD or diabetic patients   with > or = 2 CHD risk factors.     LDL-C is now calculated using the Amelia   calculation, which is a validated novel method providing   better accuracy than the Friedewald equation in the   estimation of LDL-C.   Emery BECKWITH et al. NADINE. 2013;310(19): 0514-2182   (http://education.[employer]/faq/ADB398)      HDL/Cholesterol Ratio 07/09/2022 3.5  <5.0 (calc) Final    Non HDL Chol. (LDL+VLDL) 07/09/2022 173 (H)  <130 mg/dL (calc) Final    Comment: For patients with diabetes plus 1 major ASCVD risk   factor, treating to a non-HDL-C goal of <100 mg/dL   (LDL-C of <70 mg/dL) is considered a therapeutic   option.      WBC 07/09/2022 3.8  3.8 - 10.8 Thousand/uL Final    RBC 07/09/2022 4.35  3.80 - 5.10 Million/uL Final    Hemoglobin 07/09/2022 12.7  11.7 - 15.5 g/dL Final    Hematocrit 07/09/2022 39.8  35.0 - 45.0 % Final    MCV 07/09/2022 91.5  80.0 - 100.0 fL Final    MCH 07/09/2022 29.2  27.0 - 33.0 pg Final    MCHC 07/09/2022 31.9 (L)  32.0 - 36.0 g/dL Final    RDW 07/09/2022 12.0  11.0 - 15.0 % Final    Platelets 07/09/2022 294  140 - 400 Thousand/uL Final    MPV 07/09/2022 9.3  7.5 - 12.5 fL Final    Neutrophils, Abs 07/09/2022 2,478  1,500 - 7,800 cells/uL Final    Lymph # 07/09/2022 1,060  850 - 3,900 cells/uL Final    Mono # 07/09/2022 213  200 - 950 cells/uL Final    Eos # 07/09/2022 19  15 - 500 cells/uL Final    Baso # 07/09/2022 30  0 - 200 cells/uL Final    Neutrophils Relative 07/09/2022 65.2  % Final    Lymph % 07/09/2022 27.9  % Final    Mono % 07/09/2022 5.6  % Final    Eosinophil % 07/09/2022 0.5  % Final    Basophil % 07/09/2022 0.8  % Final    T4, Free 07/09/2022 1.0  0.8 - 1.8 ng/dL Final    TSH 07/09/2022 8.19 (H)  0.40 - 4.50 mIU/L Final    Color, UA 07/09/2022 YELLOW  YELLOW Final    Appearance, UA 07/09/2022 CLEAR  CLEAR  Final    Specific Gravity, UA 07/09/2022 1.005  1.001 - 1.035 Final    pH, UA 07/09/2022 5.5  5.0 - 8.0 Final    Glucose, UA 07/09/2022 NEGATIVE  NEGATIVE Final    Bilirubin, UA 07/09/2022 NEGATIVE  NEGATIVE Final    Ketones, UA 07/09/2022 NEGATIVE  NEGATIVE Final    Occult Blood UA 07/09/2022 NEGATIVE  NEGATIVE Final    Protein, UA 07/09/2022 NEGATIVE  NEGATIVE Final    Nitrite, UA 07/09/2022 NEGATIVE  NEGATIVE Final    Leukocytes, UA 07/09/2022 NEGATIVE  NEGATIVE Final    Vitamin D, 25-OH, Total 07/09/2022 17 (L)  30 - 100 ng/mL Final    Comment: Vitamin D Status         25-OH Vitamin D:     Deficiency:                    <20 ng/mL  Insufficiency:             20 - 29 ng/mL  Optimal:                 > or = 30 ng/mL     For 25-OH Vitamin D testing on patients on   D2-supplementation and patients for whom quantitation   of D2 and D3 fractions is required, the QuestAssureD(TM)  25-OH VIT D, (D2,D3), LC/MS/MS is recommended: order   code 68576 (patients >2yrs).  See Note 1     Note 1     For additional information, please refer to   http://education.[employer]/faq/TVY561   (This link is being provided for informational/  educational purposes only.)         Assessment & Plan:      Maya was seen today for annual exam and follow-up.  Levothyroxine 50 mcg every morning will be ordered.Thyroid function tests will be repeated in 2 months.    The patient should take OTC vitamin-D in a dose of 2000 units daily.  A vitamin-D level will be obtained in 6 months.  A bone density study will be ordered.    Prevnar-20 vaccine will be administered today.    Diagnoses and all orders for this visit:    Well adult exam    Vitamin D deficiency  -     Cancel: Vitamin D; Future  -     Vitamin D; Future  -     Vitamin D    Other hyperlipidemia    Acquired hypothyroidism  -     Cancel: TSH; Future  -     Cancel: T4, Free; Future  -     T4, Free; Future  -     TSH; Future  -     T4, Free  -     TSH    Postmenopausal  -     DXA Bone  Density Spine And Hip; Future    Other orders  -     (In Office Administered) Pneumococcal Conjugate Vaccine (20 Valent) (IM)  -     levothyroxine (SYNTHROID) 50 MCG tablet; Take 1 tablet (50 mcg total) by mouth before breakfast.       Follow up in about 6 months (around 1/25/2023).     Zachery Mustafa MD

## 2022-09-27 LAB
25(OH)D3 SERPL-MCNC: 88 NG/ML (ref 30–100)
T4 FREE SERPL-MCNC: 1.3 NG/DL (ref 0.8–1.8)
TSH SERPL-ACNC: 1.77 MIU/L (ref 0.4–4.5)

## 2022-09-29 ENCOUNTER — TELEPHONE (OUTPATIENT)
Dept: SPORTS MEDICINE | Facility: CLINIC | Age: 67
End: 2022-09-29
Payer: COMMERCIAL

## 2022-09-29 NOTE — TELEPHONE ENCOUNTER
LUIS FELIPE on patient phone to let her know we will have clinic at [place] 10/04/22 at 9 am at [address] instead of   [place] .  Please call office at [phone].

## 2022-09-29 NOTE — TELEPHONE ENCOUNTER
[facility] for patient Maya to let her know that I got her message and canceled her apt at [place] and provided her with [facility] phone [phone] to schedule an apt.    ----- Message from Chanelle Benitez, Patient Care Assistant sent at 9/29/2022  1:17 PM CDT -----  Regarding: call back  Contact: Pt  Pt is requesting a call back in regards to not accepting appt. Pt states she only wants to come to [facility] and refuses to go on [place]. Pt is requesting a call back with a date when office will be back at [facility].      Pt @ [phone]

## 2022-10-05 ENCOUNTER — TELEPHONE (OUTPATIENT)
Dept: INTERNAL MEDICINE | Facility: CLINIC | Age: 67
End: 2022-10-05
Payer: COMMERCIAL

## 2022-10-05 RX ORDER — ALENDRONATE SODIUM 70 MG/1
70 TABLET ORAL
Qty: 12 TABLET | Refills: 3 | Status: SHIPPED | OUTPATIENT
Start: 2022-10-05 | End: 2022-10-05

## 2022-10-05 NOTE — TELEPHONE ENCOUNTER
She is calling for bmd results.    Please advise what I can recommend.    (I told her lab results from September and to continue same dose of thyroid medication till her next annual in July 2023)

## 2022-10-05 NOTE — TELEPHONE ENCOUNTER
I read letter to her.  She says she didn't receive.    She prefers not to start the fosamax right now because she has been on thyroid meds since July and lost weight from exercise.  She will do the calcium and vitamin d daily and continue her exercise.  I mentioned the light weight bearing exerise 2-3 times a week.  She doesn't like taking a lot of meds.   Her mother and aunt lived to be close to 100 and took no medication.    She has appt 10/11 with orthopedic and will discuss exercises then.

## 2022-10-05 NOTE — TELEPHONE ENCOUNTER
The bone density study showed that osteoporosis is present.  ( Osteoporosis is noted at the top of the thigh bone).  Osteoporosis is a severe decrease in bone density or strength. It increases the long-term risk for bone fractures. Fosamax tablets taken once a week is recommended for treatment.  A repeat bone density study in 2 years is recommended.  ( A letter with results and recommendations was mailed on 09/01/2022 ).    A prescription order will be entered.

## 2022-10-05 NOTE — TELEPHONE ENCOUNTER
----- Message from Rciky Ibanez sent at 10/5/2022  9:39 AM CDT -----  Contact: [phone]  Pt said she needs a call back all the info she would give.

## 2022-10-06 ENCOUNTER — TELEPHONE (OUTPATIENT)
Dept: SPORTS MEDICINE | Facility: CLINIC | Age: 67
End: 2022-10-06
Payer: COMMERCIAL

## 2022-10-06 DIAGNOSIS — M79.601 RIGHT ARM PAIN: Primary | ICD-10-CM

## 2022-10-11 ENCOUNTER — OFFICE VISIT (OUTPATIENT)
Dept: SPORTS MEDICINE | Facility: CLINIC | Age: 67
End: 2022-10-11
Payer: COMMERCIAL

## 2022-10-11 ENCOUNTER — HOSPITAL ENCOUNTER (OUTPATIENT)
Dept: RADIOLOGY | Facility: HOSPITAL | Age: 67
Discharge: HOME | End: 2022-10-11
Attending: PHYSICIAN ASSISTANT
Payer: COMMERCIAL

## 2022-10-11 VITALS
WEIGHT: 134.69 LBS | DIASTOLIC BLOOD PRESSURE: 79 MMHG | HEART RATE: 66 BPM | BODY MASS INDEX: 22.44 KG/M2 | HEIGHT: 65 IN | SYSTOLIC BLOOD PRESSURE: 135 MMHG

## 2022-10-11 DIAGNOSIS — M79.601 RIGHT ARM PAIN: ICD-10-CM

## 2022-10-11 DIAGNOSIS — M79.621 PAIN IN RIGHT UPPER ARM: Primary | ICD-10-CM

## 2022-10-11 DIAGNOSIS — M75.21 BICEPS TENDINITIS OF RIGHT UPPER EXTREMITY: ICD-10-CM

## 2022-10-11 PROCEDURE — 73060 XR HUMERUS 2 VIEW RIGHT: ICD-10-PCS | Mod: 26,RT,, | Performed by: RADIOLOGY

## 2022-10-11 PROCEDURE — 1160F PR REVIEW ALL MEDS BY PRESCRIBER/CLIN PHARMACIST DOCUMENTED: ICD-10-PCS | Mod: CPTII,S$GLB,, | Performed by: PHYSICIAN ASSISTANT

## 2022-10-11 PROCEDURE — 3075F SYST BP GE 130 - 139MM HG: CPT | Mod: CPTII,S$GLB,, | Performed by: PHYSICIAN ASSISTANT

## 2022-10-11 PROCEDURE — 3078F PR MOST RECENT DIASTOLIC BLOOD PRESSURE < 80 MM HG: ICD-10-PCS | Mod: CPTII,S$GLB,, | Performed by: PHYSICIAN ASSISTANT

## 2022-10-11 PROCEDURE — 73060 X-RAY EXAM OF HUMERUS: CPT | Mod: TC,PO,RT

## 2022-10-11 PROCEDURE — 1101F PR PT FALLS ASSESS DOC 0-1 FALLS W/OUT INJ PAST YR: ICD-10-PCS | Mod: CPTII,S$GLB,, | Performed by: PHYSICIAN ASSISTANT

## 2022-10-11 PROCEDURE — 1160F RVW MEDS BY RX/DR IN RCRD: CPT | Mod: CPTII,S$GLB,, | Performed by: PHYSICIAN ASSISTANT

## 2022-10-11 PROCEDURE — 3288F PR FALLS RISK ASSESSMENT DOCUMENTED: ICD-10-PCS | Mod: CPTII,S$GLB,, | Performed by: PHYSICIAN ASSISTANT

## 2022-10-11 PROCEDURE — 3288F FALL RISK ASSESSMENT DOCD: CPT | Mod: CPTII,S$GLB,, | Performed by: PHYSICIAN ASSISTANT

## 2022-10-11 PROCEDURE — 99204 PR OFFICE/OUTPT VISIT, NEW, LEVL IV, 45-59 MIN: ICD-10-PCS | Mod: S$GLB,,, | Performed by: PHYSICIAN ASSISTANT

## 2022-10-11 PROCEDURE — 1159F MED LIST DOCD IN RCRD: CPT | Mod: CPTII,S$GLB,, | Performed by: PHYSICIAN ASSISTANT

## 2022-10-11 PROCEDURE — 1159F PR MEDICATION LIST DOCUMENTED IN MEDICAL RECORD: ICD-10-PCS | Mod: CPTII,S$GLB,, | Performed by: PHYSICIAN ASSISTANT

## 2022-10-11 PROCEDURE — 3008F BODY MASS INDEX DOCD: CPT | Mod: CPTII,S$GLB,, | Performed by: PHYSICIAN ASSISTANT

## 2022-10-11 PROCEDURE — 1101F PT FALLS ASSESS-DOCD LE1/YR: CPT | Mod: CPTII,S$GLB,, | Performed by: PHYSICIAN ASSISTANT

## 2022-10-11 PROCEDURE — 73060 X-RAY EXAM OF HUMERUS: CPT | Mod: 26,RT,, | Performed by: RADIOLOGY

## 2022-10-11 PROCEDURE — 99999 PR PBB SHADOW E&M-EST. PATIENT-LVL III: ICD-10-PCS | Mod: PBBFAC,,, | Performed by: PHYSICIAN ASSISTANT

## 2022-10-11 PROCEDURE — 3008F PR BODY MASS INDEX (BMI) DOCUMENTED: ICD-10-PCS | Mod: CPTII,S$GLB,, | Performed by: PHYSICIAN ASSISTANT

## 2022-10-11 PROCEDURE — 3078F DIAST BP <80 MM HG: CPT | Mod: CPTII,S$GLB,, | Performed by: PHYSICIAN ASSISTANT

## 2022-10-11 PROCEDURE — 3075F PR MOST RECENT SYSTOLIC BLOOD PRESS GE 130-139MM HG: ICD-10-PCS | Mod: CPTII,S$GLB,, | Performed by: PHYSICIAN ASSISTANT

## 2022-10-11 PROCEDURE — 99999 PR PBB SHADOW E&M-EST. PATIENT-LVL III: CPT | Mod: PBBFAC,,, | Performed by: PHYSICIAN ASSISTANT

## 2022-10-11 PROCEDURE — 99204 OFFICE O/P NEW MOD 45 MIN: CPT | Mod: S$GLB,,, | Performed by: PHYSICIAN ASSISTANT

## 2022-10-11 RX ORDER — NAPROXEN 500 MG/1
500 TABLET ORAL 2 TIMES DAILY WITH MEALS
Qty: 60 TABLET | Refills: 2 | Status: SHIPPED | OUTPATIENT
Start: 2022-10-11 | End: 2023-01-10

## 2022-10-11 NOTE — PROGRESS NOTES
CC: RIGHT upper arm pain    Patient is a 67 y.o. female who presents today for initial evaluation of right upper arm pain.  Patient reports that she began experiencing pain in her right upper arm after bumping it against something in .  This initially started off as some mild soreness and tenderness around the deltoid, but seems to have progressed to pain in the biceps region that is worse with movement and overhead activity.  She denies any associated numbness or tingling of the right upper extremity.  She does notice some slight weakness of the right arm as well.  She has been resting and icing the area.  She remains quite active.  No prior injuries or surgeries of the right upper extremity.    Is affecting ADLs.  Pain is 4/10 at it's worst.      Past Medical History:   Diagnosis Date    Hyperlipidemia     Leukopenia     Unspecified vitamin D deficiency        Past Surgical History:   Procedure Laterality Date     SECTION      COLONOSCOPY N/A 2020    Procedure: COLONOSCOPY;  Surgeon: Arturo Guzman MD;  Location: [address]);  Service: Endoscopy;  Laterality: N/A;    MOUTH SURGERY         Family History   Problem Relation Age of Onset    Cancer Mother 91        bladder    Cataracts Mother     Bladder Cancer Mother     Hypertension Father     Heart disease Father 69        M.I.    Hypertension Brother     COPD Sister     Cancer Sister         throat    Melanoma Neg Hx     Breast cancer Neg Hx     Colon cancer Neg Hx     Diabetes Neg Hx     Ovarian cancer Neg Hx     Stroke Neg Hx          Current Outpatient Medications:     calcium carbonate (CALCIUM 500 ORAL), Take by mouth. 1200mg calcium and 2000 units vitamin d, Disp: , Rfl:     IBUPROFEN ORAL, Take by mouth 2 (two) times daily as needed., Disp: , Rfl:     levothyroxine (SYNTHROID) 50 MCG tablet, Take 1 tablet (50 mcg total) by mouth before breakfast., Disp: 90 tablet, Rfl: 3    Review of patient's allergies indicates:   Allergen  "Reactions    Bactrim [sulfamethoxazole-trimethoprim] Rash and Other (See Comments)     High fever          REVIEW OF SYSTEMS:  Constitution: Negative. Negative for chills, fever and night sweats.   HENT: Negative for congestion and headaches.    Eyes: Negative for blurred vision, left vision loss and right vision loss.   Cardiovascular: Negative for chest pain and syncope.   Respiratory: Negative for cough and shortness of breath.    Endocrine: Negative for polydipsia, polyphagia and polyuria.   Hematologic/Lymphatic: Negative for bleeding problem. Does not bruise/bleed easily.   Skin: Negative for dry skin, itching and rash.   Musculoskeletal: Negative for falls.  Positive for right shoulder pain and muscle weakness.   Gastrointestinal: Negative for abdominal pain and bowel incontinence.   Genitourinary: Negative for bladder incontinence and nocturia.   Neurological: Negative for disturbances in coordination, loss of balance and seizures.   Psychiatric/Behavioral: Negative for depression. The patient does not have insomnia.    Allergic/Immunologic: Negative for hives and persistent infections.      PHYSICAL EXAMINATION:  Vitals:  /79   Pulse 66   Ht 5' 5" (1.651 m)   Wt 61.1 kg (134 lb 11.2 oz)   BMI 22.42 kg/m²    General: The patient is alert and oriented x 3.  Mood is pleasant.  Observation of ears, eyes and nose reveal no gross abnormalities.  No labored breathing observed.  Gait is coordinated. Patient can toe walk and heel walk without difficulty.      RIGHT SHOULDER / UPPER EXTREMITY EXAM    OBSERVATION:     Swelling  none  Deformity  none   Discoloration  none   Scapular winging none   Scars   none  Atrophy  none    TENDERNESS / CREPITUS (T/C):          T/C      T/C   Clavicle   -/-  SUPRAspinatus    -/-     AC Jt.    -/-  INFRAspinatus  -/-    SC Jt.    -/-  Deltoid    +/-      G. Tuberosity  -/-  LH BICEP groove  +/-   Acromion:  -/-  Midline Neck   -/-     Scapular " Spine -/-  Trapezium   -/-   SMA Scapula  -/-  GH jt. line - post  -/-     Scapulothoracic  -/-         ROM: (* = with pain)  Left shoulder   Right shoulder        AROM (PROM)   AROM (PROM)   FE    170° (175°)     170°* (175*°)     ER at 0°    60°  (65°)    45°*  (50°*)   ER at 90° ABD  90°  (90°)    70°*  (70°*)   IR at 90°  ABD   NA  (40°)     40*  (40°*)      IR (spine level)   T10     T12    STRENGTH: (* = with pain) Left shoulder   Right shoulder   SCAPTION   5/5    4+/5*    IR    5/5    5/5   ER    5/5    4/5*   BICEPS   5/5    4/5*   Deltoid    5/5    5/5     SIGNS:  Painful side       NEER   -    OLOUISAS  neg    CABRERA   +    SPEEDS  +     DROP ARM   -   BELLY PRESS neg   Superior escape none    LIFT-OFF  neg   X-Body ADD    neg    MOVING VALGUS neg        STABILITY TESTING    Left shoulder   Right shoulder    Translation     Anterior  up face    up face    Posterior  up face    up face    Sulcus   < 10mm   < 10 mm     Signs   Apprehension   neg      neg       Relocation   no change     no change      Jerk test  neg     neg    EXTREMITY NEURO-VASCULAR EXAM:    Sensation grossly intact to light touch all dermatomal regions.    DTR 2+ Biceps, Triceps, BR and Negative Coreys sign   Grossly intact motor function at Elbow, Wrist and Hand   Distal pulses radial and ulnar 2+, brisk cap refill, symmetric.      NECK:  Painless FROM and spinous processes non-tender. Negative Spurlings sign.      OTHER FINDINGS:  - scapular dyskinesia    XRAYS right humerus (10/11/2022):  No acute fracture, dislocation, or aggressive/lytic osseous lesions seen. No significant soft tissue swelling or retained foreign body.           ASSESSMENT:     Right upper arm pain  Proximal biceps tendinitis, right      PLAN:      I made the decision to obtain old records of the patient including previous notes and imaging. New imaging was ordered today of the extremity or extremities evaluated. I independently reviewed and interpreted  the radiographs and/or MRIs today as well as prior imaging, if available.    We discussed at length different treatment options including conservative vs surgical management. These include anti-inflammatories, acetaminophen, rest, ice, heat, formal physical therapy including strengthening and stretching exercises, home exercise programs, dry needling, corticosteroid injections, and finally surgical intervention.      HEP 19134 - Patient was instructed and demonstrated a rotator cuff/periscapular HEP. The patient then demonstrated understanding of exercises and proper technique. This program was performed for 8 minutes.     Prescription for naproxen 500 mg 1 p.o. b.i.d. with meals p.r.n. pain provided today.  Advised patient to take this twice daily for 7-10 days, then as needed.  Advised her to refrain from taking other anti-inflammatory medications while taking naproxen, however may alternate/augment with acetaminophen if needed.  Also encouraged her to ice the area for 20-30 minutes as needed for pain control.    Follow-up in 4 weeks.  If no improvement, patient may consider corticosteroid injection or formal physical therapy.      All questions were answered, patient will contact us for questions or concerns in the interim.      Medical Dictation software was used during the dictation of portions or the entirety of this medical record.  Phonetic or grammatic errors may exist due to the use of this software. For clarification, refer to the author of the document.

## 2023-01-05 ENCOUNTER — TELEPHONE (OUTPATIENT)
Dept: INTERNAL MEDICINE | Facility: CLINIC | Age: 68
End: 2023-01-05
Payer: COMMERCIAL

## 2023-01-05 NOTE — TELEPHONE ENCOUNTER
Cough for a week.  Mostly at night.  thinks drip in throat.   Denies fever, body aches  or loss of appetite or fatigue.    Hasn't tried anything yet because didn't know what could take with thyroid rx.  Says can't tolerate nyquil.     She hasn't been around any family to get covid.  Mask everyone.    I recommended xyzal. And push fluids and has appt 1/26 to see us.

## 2023-01-05 NOTE — TELEPHONE ENCOUNTER
----- Message from Iqra Parker sent at 1/5/2023 12:03 PM CST -----  Contact: [phone]  1MEDICALADVICE     Patient is calling for Medical Advice regarding:cough     How long has patient had these symptoms: 1week    Pharmacy name and phone#:   [facility] #[id number] - [address]  [address] [place] [id number]  Phone: [phone] Fax: [phone]        Would like response via [facility]:  call     Comments:

## 2023-01-25 ENCOUNTER — TELEPHONE (OUTPATIENT)
Dept: INTERNAL MEDICINE | Facility: CLINIC | Age: 68
End: 2023-01-25
Payer: COMMERCIAL

## 2023-01-25 DIAGNOSIS — M81.0 AGE-RELATED OSTEOPOROSIS WITHOUT CURRENT PATHOLOGICAL FRACTURE: ICD-10-CM

## 2023-01-25 DIAGNOSIS — Z00.00 WELL ADULT EXAM: Primary | ICD-10-CM

## 2023-01-25 NOTE — TELEPHONE ENCOUNTER
Appt for tomorrow/ Thursday is rescheduled since you booked out.    She said she is well and willing to wait till July when annual is due.  She is still on thyroid medication.  We rescheduled w/ full labs prior to July appt.    Is now willing to start on fosamax that you recommended in October.  [facility]    She finished medication that orthopedic dr gave her.   Has been doing the calicum and vitamin d and weight lifting exercises.    Please let me know once rx sent to [facility] so I can alert her and make sure she  Picks up.    Thanks lola

## 2023-01-27 ENCOUNTER — TELEPHONE (OUTPATIENT)
Dept: INTERNAL MEDICINE | Facility: CLINIC | Age: 68
End: 2023-01-27
Payer: COMMERCIAL

## 2023-01-27 RX ORDER — ALENDRONATE SODIUM 70 MG/1
70 TABLET ORAL
Qty: 4 TABLET | Refills: 11 | Status: SHIPPED | OUTPATIENT
Start: 2023-01-27 | End: 2024-01-03

## 2023-01-27 NOTE — TELEPHONE ENCOUNTER
----- Message from Carla Balderrama sent at 1/27/2023  1:38 PM CST -----  Contact: SCOTTIE RUSSELL [[id number]]@ [phone]  Patient labs need to be done at the [facility]. Please send orders there.

## 2023-02-28 ENCOUNTER — PATIENT MESSAGE (OUTPATIENT)
Dept: INTERNAL MEDICINE | Facility: CLINIC | Age: 68
End: 2023-02-28
Payer: COMMERCIAL

## 2023-07-05 ENCOUNTER — TELEPHONE (OUTPATIENT)
Dept: INTERNAL MEDICINE | Facility: CLINIC | Age: 68
End: 2023-07-05
Payer: COMMERCIAL

## 2023-07-05 DIAGNOSIS — Z12.31 BREAST CANCER SCREENING BY MAMMOGRAM: Primary | ICD-10-CM

## 2023-07-05 NOTE — TELEPHONE ENCOUNTER
----- Message from Misa Mahan sent at 7/5/2023 10:57 AM CDT -----  Contact: Maya   Maya santana nilam a call back about getting orders for a MAMMO

## 2023-07-20 RX ORDER — LEVOTHYROXINE SODIUM 50 UG/1
TABLET ORAL
Qty: 90 TABLET | Refills: 0 | Status: SHIPPED | OUTPATIENT
Start: 2023-07-20 | End: 2023-10-16

## 2023-07-20 NOTE — TELEPHONE ENCOUNTER
Care Due:                  Date            Visit Type   Department     Provider  --------------------------------------------------------------------------------                                EP -                              PRIMARY      [facility] INTERNAL  Last Visit: 07-      [facility] ([facility])   MEDICINE       Zacherymaximiliano Mustfaa                              EP -                              PRIMARY      [facility] INTERNAL  Next Visit: 07-      [facility] ([facility])   MEDICINE       Zacherychristo Mustafa                                                            Last  Test          Frequency    Reason                     Performed    Due Date  --------------------------------------------------------------------------------    TSH.........  12 months..  levothyroxine............  09- 09-    [facility] Embedded Care Due Messages. Reference number: [id number].   7/20/2023 3:17:38 AM CDT   Notification of Discharge   This is a Notification of Discharge from our facility [address]  Please be advised that this patient has been discharge from our facility  Below you will find the admission and discharge date and time including the patients disposition  UTILIZATION REVIEW CONTACT:  Jose Romero  Utilization   Network Utilization Review Department  Phone: [phone] x carefully listen to the prompts  All voicemails are confidential   Email: [email or web address]     PHYSICIAN ADVISORY SERVICES:  FOR ZQHB-UY-NUME REVIEW - MEDICAL NECESSITY DENIAL  Phone: [phone]  Fax: [phone]  Email: [email or web address]     PRESENTATION DATE: 7/16/2021  9:07 AM  OBERVATION ADMISSION DATE:   INPATIENT ADMISSION DATE: 7/16/21  9:42 AM   DISCHARGE DATE: 7/23/2021  1:10 PM  DISPOSITION: Non [facility] Non [facility]/SNU      IMPORTANT INFORMATION:  Send all requests for admission clinical reviews, approved or denied determinations and any other requests to dedicated fax number below belonging to the campus where the patient is receiving treatment   List of dedicated fax numbers:  [address] [place] [address] DENIALS (Administrative/Medical Necessity) [phone]   [address] [place] [address] (Maternity/NICU/Pediatrics) [phone]   Caitlin Garcia [phone]   Sidney Staton [phone]   Jimi Adnrew [phone]   [facility] [address] [facility] [phone]   [facility]  [phone]   [address] [place] [address], [address]  [address] [facility] [address] [facility] [phone]

## 2023-07-20 NOTE — TELEPHONE ENCOUNTER
Provider Staff:  Action required for this patient     Please see care gap opportunities below in Care Due Message.    Thanks!  [facility]     Appointments      Date Provider   Last Visit   7/25/2022 Zachery Mustafa MD   Next Visit   7/27/2023 Zachery Mustafa MD     Refill Decision Note   Maya Ervin  is requesting a refill authorization.  Brief Assessment and Rationale for Refill:  Approve     Medication Therapy Plan:         Comments:     Note composed:9:58 AM 07/20/2023

## 2023-07-22 LAB
25(OH)D3 SERPL-MCNC: 30 NG/ML (ref 30–100)
ALBUMIN SERPL-MCNC: 4.4 G/DL (ref 3.6–5.1)
ALBUMIN/GLOB SERPL: 1.6 (CALC) (ref 1–2.5)
ALP SERPL-CCNC: 44 U/L (ref 37–153)
ALT SERPL-CCNC: 25 U/L (ref 6–29)
APPEARANCE UR: CLEAR
AST SERPL-CCNC: 28 U/L (ref 10–35)
BASOPHILS # BLD AUTO: 41 CELLS/UL (ref 0–200)
BASOPHILS NFR BLD AUTO: 1.4 %
BILIRUB SERPL-MCNC: 0.5 MG/DL (ref 0.2–1.2)
BILIRUB UR QL STRIP: NEGATIVE
BUN SERPL-MCNC: 11 MG/DL (ref 7–25)
BUN/CREAT SERPL: NORMAL (CALC) (ref 6–22)
CALCIUM SERPL-MCNC: 9.2 MG/DL (ref 8.6–10.4)
CHLORIDE SERPL-SCNC: 104 MMOL/L (ref 98–110)
CHOLEST SERPL-MCNC: 203 MG/DL
CHOLEST/HDLC SERPL: 3.1 (CALC)
CO2 SERPL-SCNC: 27 MMOL/L (ref 20–32)
COLOR UR: YELLOW
CREAT SERPL-MCNC: 0.75 MG/DL (ref 0.5–1.05)
EGFR: 88 ML/MIN/1.73M2
EOSINOPHIL # BLD AUTO: 29 CELLS/UL (ref 15–500)
EOSINOPHIL NFR BLD AUTO: 1 %
ERYTHROCYTE [DISTWIDTH] IN BLOOD BY AUTOMATED COUNT: 12.2 % (ref 11–15)
GLOBULIN SER CALC-MCNC: 2.7 G/DL (CALC) (ref 1.9–3.7)
GLUCOSE SERPL-MCNC: 96 MG/DL (ref 65–99)
GLUCOSE UR QL STRIP: NEGATIVE
HBA1C MFR BLD: 5.3 % OF TOTAL HGB
HCT VFR BLD AUTO: 39.5 % (ref 35–45)
HDLC SERPL-MCNC: 65 MG/DL
HGB BLD-MCNC: 12.9 G/DL (ref 11.7–15.5)
HGB UR QL STRIP: NEGATIVE
KETONES UR QL STRIP: NEGATIVE
LDLC SERPL CALC-MCNC: 122 MG/DL (CALC)
LEUKOCYTE ESTERASE UR QL STRIP: NEGATIVE
LYMPHOCYTES # BLD AUTO: 1157 CELLS/UL (ref 850–3900)
LYMPHOCYTES NFR BLD AUTO: 39.9 %
MCH RBC QN AUTO: 29.7 PG (ref 27–33)
MCHC RBC AUTO-ENTMCNC: 32.7 G/DL (ref 32–36)
MCV RBC AUTO: 90.8 FL (ref 80–100)
MONOCYTES # BLD AUTO: 220 CELLS/UL (ref 200–950)
MONOCYTES NFR BLD AUTO: 7.6 %
NEUTROPHILS # BLD AUTO: 1453 CELLS/UL (ref 1500–7800)
NEUTROPHILS NFR BLD AUTO: 50.1 %
NITRITE UR QL STRIP: NEGATIVE
NONHDLC SERPL-MCNC: 138 MG/DL (CALC)
PH UR STRIP: 5.5 [PH] (ref 5–8)
PLATELET # BLD AUTO: 258 THOUSAND/UL (ref 140–400)
PMV BLD REES-ECKER: 9.3 FL (ref 7.5–12.5)
POTASSIUM SERPL-SCNC: 4.7 MMOL/L (ref 3.5–5.3)
PROT SERPL-MCNC: 7.1 G/DL (ref 6.1–8.1)
PROT UR QL STRIP: NEGATIVE
RBC # BLD AUTO: 4.35 MILLION/UL (ref 3.8–5.1)
SERVICE CMNT-IMP: ABNORMAL
SODIUM SERPL-SCNC: 139 MMOL/L (ref 135–146)
SP GR UR STRIP: 1.01 (ref 1–1.03)
T4 FREE SERPL-MCNC: 1.2 NG/DL (ref 0.8–1.8)
TRIGL SERPL-MCNC: 69 MG/DL
TSH SERPL-ACNC: 2.01 MIU/L (ref 0.4–4.5)
WBC # BLD AUTO: 2.9 THOUSAND/UL (ref 3.8–10.8)

## 2023-07-27 ENCOUNTER — HOSPITAL ENCOUNTER (OUTPATIENT)
Dept: RADIOLOGY | Facility: HOSPITAL | Age: 68
Discharge: HOME | End: 2023-07-27
Attending: INTERNAL MEDICINE
Payer: COMMERCIAL

## 2023-07-27 ENCOUNTER — OFFICE VISIT (OUTPATIENT)
Dept: INTERNAL MEDICINE | Facility: CLINIC | Age: 68
End: 2023-07-27
Payer: COMMERCIAL

## 2023-07-27 VITALS
RESPIRATION RATE: 15 BRPM | SYSTOLIC BLOOD PRESSURE: 118 MMHG | BODY MASS INDEX: 21.21 KG/M2 | OXYGEN SATURATION: 100 % | HEART RATE: 69 BPM | WEIGHT: 132 LBS | HEIGHT: 66 IN | TEMPERATURE: 98 F | DIASTOLIC BLOOD PRESSURE: 72 MMHG | HEIGHT: 65 IN | BODY MASS INDEX: 22.08 KG/M2 | WEIGHT: 132.5 LBS

## 2023-07-27 DIAGNOSIS — E03.9 ACQUIRED HYPOTHYROIDISM: Chronic | ICD-10-CM

## 2023-07-27 DIAGNOSIS — E78.49 OTHER HYPERLIPIDEMIA: ICD-10-CM

## 2023-07-27 DIAGNOSIS — E55.9 VITAMIN D DEFICIENCY: ICD-10-CM

## 2023-07-27 DIAGNOSIS — M81.0 AGE-RELATED OSTEOPOROSIS WITHOUT CURRENT PATHOLOGICAL FRACTURE: ICD-10-CM

## 2023-07-27 DIAGNOSIS — R07.9 CHEST PAIN, UNSPECIFIED TYPE: ICD-10-CM

## 2023-07-27 DIAGNOSIS — Z00.00 WELL ADULT EXAM: Primary | ICD-10-CM

## 2023-07-27 DIAGNOSIS — Z12.31 BREAST CANCER SCREENING BY MAMMOGRAM: ICD-10-CM

## 2023-07-27 PROCEDURE — 77067 MAMMO DIGITAL SCREENING BILAT WITH TOMO: ICD-10-PCS | Mod: 26,,, | Performed by: RADIOLOGY

## 2023-07-27 PROCEDURE — 1160F PR REVIEW ALL MEDS BY PRESCRIBER/CLIN PHARMACIST DOCUMENTED: ICD-10-PCS | Mod: CPTII,S$GLB,, | Performed by: INTERNAL MEDICINE

## 2023-07-27 PROCEDURE — 99999 PR PBB SHADOW E&M-EST. PATIENT-LVL IV: CPT | Mod: PBBFAC,,, | Performed by: INTERNAL MEDICINE

## 2023-07-27 PROCEDURE — 99999 PR PBB SHADOW E&M-EST. PATIENT-LVL IV: ICD-10-PCS | Mod: PBBFAC,,, | Performed by: INTERNAL MEDICINE

## 2023-07-27 PROCEDURE — 3044F PR MOST RECENT HEMOGLOBIN A1C LEVEL <7.0%: ICD-10-PCS | Mod: CPTII,S$GLB,, | Performed by: INTERNAL MEDICINE

## 2023-07-27 PROCEDURE — 77067 SCR MAMMO BI INCL CAD: CPT | Mod: TC,PO

## 2023-07-27 PROCEDURE — 3008F PR BODY MASS INDEX (BMI) DOCUMENTED: ICD-10-PCS | Mod: CPTII,S$GLB,, | Performed by: INTERNAL MEDICINE

## 2023-07-27 PROCEDURE — 3078F DIAST BP <80 MM HG: CPT | Mod: CPTII,S$GLB,, | Performed by: INTERNAL MEDICINE

## 2023-07-27 PROCEDURE — 3078F PR MOST RECENT DIASTOLIC BLOOD PRESSURE < 80 MM HG: ICD-10-PCS | Mod: CPTII,S$GLB,, | Performed by: INTERNAL MEDICINE

## 2023-07-27 PROCEDURE — 3008F BODY MASS INDEX DOCD: CPT | Mod: CPTII,S$GLB,, | Performed by: INTERNAL MEDICINE

## 2023-07-27 PROCEDURE — 1101F PR PT FALLS ASSESS DOC 0-1 FALLS W/OUT INJ PAST YR: ICD-10-PCS | Mod: CPTII,S$GLB,, | Performed by: INTERNAL MEDICINE

## 2023-07-27 PROCEDURE — 1160F RVW MEDS BY RX/DR IN RCRD: CPT | Mod: CPTII,S$GLB,, | Performed by: INTERNAL MEDICINE

## 2023-07-27 PROCEDURE — 3074F PR MOST RECENT SYSTOLIC BLOOD PRESSURE < 130 MM HG: ICD-10-PCS | Mod: CPTII,S$GLB,, | Performed by: INTERNAL MEDICINE

## 2023-07-27 PROCEDURE — 93010 EKG 12-LEAD: ICD-10-PCS | Mod: S$GLB,,, | Performed by: INTERNAL MEDICINE

## 2023-07-27 PROCEDURE — 1159F MED LIST DOCD IN RCRD: CPT | Mod: CPTII,S$GLB,, | Performed by: INTERNAL MEDICINE

## 2023-07-27 PROCEDURE — 99397 PER PM REEVAL EST PAT 65+ YR: CPT | Mod: S$GLB,,, | Performed by: INTERNAL MEDICINE

## 2023-07-27 PROCEDURE — 3074F SYST BP LT 130 MM HG: CPT | Mod: CPTII,S$GLB,, | Performed by: INTERNAL MEDICINE

## 2023-07-27 PROCEDURE — 1126F AMNT PAIN NOTED NONE PRSNT: CPT | Mod: CPTII,S$GLB,, | Performed by: INTERNAL MEDICINE

## 2023-07-27 PROCEDURE — 3044F HG A1C LEVEL LT 7.0%: CPT | Mod: CPTII,S$GLB,, | Performed by: INTERNAL MEDICINE

## 2023-07-27 PROCEDURE — 1101F PT FALLS ASSESS-DOCD LE1/YR: CPT | Mod: CPTII,S$GLB,, | Performed by: INTERNAL MEDICINE

## 2023-07-27 PROCEDURE — 77063 MAMMO DIGITAL SCREENING BILAT WITH TOMO: ICD-10-PCS | Mod: 26,,, | Performed by: RADIOLOGY

## 2023-07-27 PROCEDURE — 1126F PR PAIN SEVERITY QUANTIFIED, NO PAIN PRESENT: ICD-10-PCS | Mod: CPTII,S$GLB,, | Performed by: INTERNAL MEDICINE

## 2023-07-27 PROCEDURE — 1159F PR MEDICATION LIST DOCUMENTED IN MEDICAL RECORD: ICD-10-PCS | Mod: CPTII,S$GLB,, | Performed by: INTERNAL MEDICINE

## 2023-07-27 PROCEDURE — 93005 ELECTROCARDIOGRAM TRACING: CPT | Mod: S$GLB,,, | Performed by: INTERNAL MEDICINE

## 2023-07-27 PROCEDURE — 3288F FALL RISK ASSESSMENT DOCD: CPT | Mod: CPTII,S$GLB,, | Performed by: INTERNAL MEDICINE

## 2023-07-27 PROCEDURE — 77067 SCR MAMMO BI INCL CAD: CPT | Mod: 26,,, | Performed by: RADIOLOGY

## 2023-07-27 PROCEDURE — 93010 ELECTROCARDIOGRAM REPORT: CPT | Mod: S$GLB,,, | Performed by: INTERNAL MEDICINE

## 2023-07-27 PROCEDURE — 99397 PR PREVENTIVE VISIT,EST,65 & OVER: ICD-10-PCS | Mod: S$GLB,,, | Performed by: INTERNAL MEDICINE

## 2023-07-27 PROCEDURE — 93005 EKG 12-LEAD: ICD-10-PCS | Mod: S$GLB,,, | Performed by: INTERNAL MEDICINE

## 2023-07-27 PROCEDURE — 77063 BREAST TOMOSYNTHESIS BI: CPT | Mod: 26,,, | Performed by: RADIOLOGY

## 2023-07-27 PROCEDURE — 3288F PR FALLS RISK ASSESSMENT DOCUMENTED: ICD-10-PCS | Mod: CPTII,S$GLB,, | Performed by: INTERNAL MEDICINE

## 2023-07-27 RX ORDER — MULTIVITAMIN
1 TABLET ORAL DAILY
COMMUNITY

## 2023-07-27 NOTE — PROGRESS NOTES
Subjective:       Patient ID: Maya Ervin is a 68 y.o. female.    Chief Complaint: Annual Exam    HPI  The patient presents for annual physical examination.  The patient reports she has been doing well.  She is exercising by walking every morning for 30 minutes.  She also moves grass in her yard and gardens.  Does note occasional anterior chest pains of  variable duration.  Pain may occur with exercise or at rest.  She states she is sleeping well at night.  No PND or orthopnea is described.  She does take naps during the daytime.    Active medical conditions include osteoporosis currently treated with Fosamax, hypothyroidism, vitamin-D deficiency, and hyperlipidemia.    Immunization record was reviewed.      Screening tests were reviewed.    No interval change in past medical history, family history, or social history since prior evaluations.  Of note, she reports for father and brother both  of myocardial infarctions in their 60s.    Review of Systems   Constitutional:  Negative for fatigue, fever and unexpected weight change.   HENT:  Negative for nasal congestion, postnasal drip, rhinorrhea and sore throat.    Eyes:  Negative for visual disturbance.   Respiratory:  Negative for cough, chest tightness, shortness of breath and wheezing.    Cardiovascular:  Positive for chest pain. Negative for palpitations and leg swelling.   Gastrointestinal:  Negative for abdominal pain and blood in stool.   Genitourinary:  Negative for dysuria, frequency and hematuria.   Musculoskeletal:  Negative for arthralgias, back pain, joint swelling and myalgias.   Integumentary:  Negative for rash.   Neurological:  Negative for dizziness, syncope, weakness, numbness and headaches.   Psychiatric/Behavioral:  Negative for sleep disturbance. The patient is not nervous/anxious.             Physical Exam  Vitals and nursing note reviewed.   Constitutional:       General: She is not in acute distress.     Appearance: Normal appearance.  She is well-developed.      Comments: The patient has lost 13 lb since 07/25/2022.   HENT:      Head: Normocephalic and atraumatic.      Right Ear: External ear normal.      Left Ear: External ear normal.      Nose: Nose normal.      Mouth/Throat:      Mouth: Mucous membranes are moist.      Pharynx: Oropharynx is clear. No oropharyngeal exudate.   Eyes:      General: No scleral icterus.     Extraocular Movements: Extraocular movements intact.      Conjunctiva/sclera: Conjunctivae normal.   Neck:      Thyroid: No thyromegaly.      Vascular: No carotid bruit or JVD.   Cardiovascular:      Rate and Rhythm: Normal rate and regular rhythm.      Pulses: Normal pulses.      Heart sounds: Normal heart sounds. No murmur heard.     No friction rub. No gallop.   Pulmonary:      Effort: Pulmonary effort is normal. No respiratory distress.      Breath sounds: Normal breath sounds. No wheezing or rales.   Chest:      Chest wall: No tenderness.   Abdominal:      General: Bowel sounds are normal. There is no abdominal bruit.      Palpations: Abdomen is soft. There is no hepatomegaly, splenomegaly or mass.      Tenderness: There is no abdominal tenderness.      Hernia: No hernia is present.   Musculoskeletal:         General: No tenderness. Normal range of motion.      Right shoulder: No deformity or effusion.      Cervical back: Normal range of motion and neck supple.      Right lower leg: No edema.      Left lower leg: No edema.   Lymphadenopathy:      Cervical: No cervical adenopathy.      Upper Body:      Right upper body: No supraclavicular adenopathy.      Left upper body: No supraclavicular adenopathy.   Skin:     General: Skin is warm and dry.      Findings: No rash.   Neurological:      Mental Status: She is alert and oriented to person, place, and time.      Cranial Nerves: No cranial nerve deficit.   Psychiatric:         Mood and Affect: Mood normal.         Speech: Speech normal.         Behavior: Behavior normal.          No visits with results within 3 Month(s) from this visit.   Latest known visit with results is:   Telephone on 01/25/2023   Component Date Value Ref Range Status    Vitamin D, 25-OH, Total 07/21/2023 30  30 - 100 ng/mL Final    Comment: Vitamin D Status         25-OH Vitamin D:     Deficiency:                    <20 ng/mL  Insufficiency:             20 - 29 ng/mL  Optimal:                 > or = 30 ng/mL     For 25-OH Vitamin D testing on patients on   D2-supplementation and patients for whom quantitation   of D2 and D3 fractions is required, the QuestAssureD(TM)  25-OH VIT D, (D2,D3), LC/MS/MS is recommended: order   code 07106 (patients >2yrs).     See Note 1     Note 1     For additional information, please refer to   http://education.Imcompany/faq/JRP007   (This link is being provided for informational/  educational purposes only.)      Hemoglobin A1C 07/21/2023 5.3  <5.7 % of total Hgb Final    Comment: For the purpose of screening for the presence of  diabetes:     <5.7%       Consistent with the absence of diabetes  5.7-6.4%    Consistent with increased risk for diabetes              (prediabetes)  > or =6.5%  Consistent with diabetes     This assay result is consistent with a decreased risk  of diabetes.     Currently, no consensus exists regarding use of  hemoglobin A1c for diagnosis of diabetes in children.     According to American Diabetes Association (ADA)  guidelines, hemoglobin A1c <7.0% represents optimal  control in non-pregnant diabetic patients. Different  metrics may apply to specific patient populations.   Standards of Medical Care in Diabetes(ADA).          T4, Free 07/21/2023 1.2  0.8 - 1.8 ng/dL Final    TSH 07/21/2023 2.01  0.40 - 4.50 mIU/L Final    WBC 07/21/2023 2.9 (L)  3.8 - 10.8 Thousand/uL Final    RBC 07/21/2023 4.35  3.80 - 5.10 Million/uL Final    Hemoglobin 07/21/2023 12.9  11.7 - 15.5 g/dL Final    Hematocrit 07/21/2023 39.5  35.0 - 45.0 % Final    MCV  07/21/2023 90.8  80.0 - 100.0 fL Final    MCH 07/21/2023 29.7  27.0 - 33.0 pg Final    MCHC 07/21/2023 32.7  32.0 - 36.0 g/dL Final    RDW 07/21/2023 12.2  11.0 - 15.0 % Final    Platelets 07/21/2023 258  140 - 400 Thousand/uL Final    MPV 07/21/2023 9.3  7.5 - 12.5 fL Final    Neutrophils, Abs 07/21/2023 1,453 (L)  1,500 - 7,800 cells/uL Final    Lymph # 07/21/2023 1,157  850 - 3,900 cells/uL Final    Mono # 07/21/2023 220  200 - 950 cells/uL Final    Eos # 07/21/2023 29  15 - 500 cells/uL Final    Baso # 07/21/2023 41  0 - 200 cells/uL Final    Neutrophils Relative 07/21/2023 50.1  % Final    Lymph % 07/21/2023 39.9  % Final    Mono % 07/21/2023 7.6  % Final    Eosinophil % 07/21/2023 1.0  % Final    Basophil % 07/21/2023 1.4  % Final    Differential Comment 07/21/2023    Final    Comment: Review of peripheral smear confirms  automated results.      Cholesterol 07/21/2023 203 (H)  <200 mg/dL Final    HDL 07/21/2023 65  > OR = 50 mg/dL Final    Triglycerides 07/21/2023 69  <150 mg/dL Final    LDL Cholesterol 07/21/2023 122 (H)  mg/dL (calc) Final    Comment: Reference range: <100     Desirable range <100 mg/dL for primary prevention;    <70 mg/dL for patients with CHD or diabetic patients   with > or = 2 CHD risk factors.     LDL-C is now calculated using the Emery-Ward   calculation, which is a validated novel method providing   better accuracy than the Friedewald equation in the   estimation of LDL-C.   Emery BECKWITH et al. NADINE. 2013;310(19): 2767-6977   (http://education.Novawise.EximForce/faq/ASM605)      HDL/Cholesterol Ratio 07/21/2023 3.1  <5.0 (calc) Final    Non HDL Chol. (LDL+VLDL) 07/21/2023 138 (H)  <130 mg/dL (calc) Final    Comment: For patients with diabetes plus 1 major ASCVD risk   factor, treating to a non-HDL-C goal of <100 mg/dL   (LDL-C of <70 mg/dL) is considered a therapeutic   option.      Glucose 07/21/2023 96  65 - 99 mg/dL Final    Comment:               Fasting  reference interval         BUN 07/21/2023 11  7 - 25 mg/dL Final    Creatinine 07/21/2023 0.75  0.50 - 1.05 mg/dL Final    eGFR 07/21/2023 88  > OR = 60 mL/min/1.73m2 Final    Comment: The eGFR is based on the CKD-EPI 2021 equation. To calculate   the new eGFR from a previous Creatinine or Cystatin C  result, go to https://www.kidney.org/professionals/  kdoqi/gfr%5Fcalculator      BUN/Creatinine Ratio 07/21/2023 NOT APPLICABLE  6 - 22 (calc) Final    Sodium 07/21/2023 139  135 - 146 mmol/L Final    Potassium 07/21/2023 4.7  3.5 - 5.3 mmol/L Final    Chloride 07/21/2023 104  98 - 110 mmol/L Final    CO2 07/21/2023 27  20 - 32 mmol/L Final    Calcium 07/21/2023 9.2  8.6 - 10.4 mg/dL Final    Total Protein 07/21/2023 7.1  6.1 - 8.1 g/dL Final    Albumin 07/21/2023 4.4  3.6 - 5.1 g/dL Final    Globulin, Total 07/21/2023 2.7  1.9 - 3.7 g/dL (calc) Final    Albumin/Globulin Ratio 07/21/2023 1.6  1.0 - 2.5 (calc) Final    Total Bilirubin 07/21/2023 0.5  0.2 - 1.2 mg/dL Final    Alkaline Phosphatase 07/21/2023 44  37 - 153 U/L Final    AST 07/21/2023 28  10 - 35 U/L Final    ALT 07/21/2023 25  6 - 29 U/L Final    Color, UA 07/21/2023 YELLOW  YELLOW Final    Appearance, UA 07/21/2023 CLEAR  CLEAR Final    Specific [place], UA 07/21/2023 1.012  1.001 - 1.035 Final    pH, UA 07/21/2023 5.5  5.0 - 8.0 Final    Glucose, UA 07/21/2023 NEGATIVE  NEGATIVE Final    Bilirubin, UA 07/21/2023 NEGATIVE  NEGATIVE Final    Ketones, UA 07/21/2023 NEGATIVE  NEGATIVE Final    Occult Blood UA 07/21/2023 NEGATIVE  NEGATIVE Final    Protein, UA 07/21/2023 NEGATIVE  NEGATIVE Final    Nitrite, UA 07/21/2023 NEGATIVE  NEGATIVE Final    Leukocytes, UA 07/21/2023 NEGATIVE  NEGATIVE Final     Results for orders placed or performed in visit on 07/27/23   IN OFFICE EKG 12-LEAD (to [place])    Collection Time: 07/27/23  9:04 AM    Narrative    Test Reason : R07.9    Vent. Rate : 066 BPM     Atrial Rate : 066 BPM     P-R Int :  150 ms          QRS Dur : 076 ms      QT Int : 404 ms       P-R-T Axes : 071 031 053 degrees     QTc Int : 423 ms    Normal sinus rhythm  Normal ECG  When compared with ECG of 24-MAR-2015 12:51,  No significant change was found  Confirmed by Jaci Doherty MD ([id number]) on 7/27/2023 4:13:01 PM    Referred By: [id number]           Confirmed By:Jaci Doherty MD       Assessment & Plan:      Maya was seen today for annual exam.  The patient should continue OTC calcium and vitamin-D supplements.    Current prescription medication will be continued for treatment of osteoporosis and hypothyroidism.    The patient was advised that she is eligible for the COVID -19 bivalent booster vaccine.    Stress echocardiogram will be obtained to further evaluate her chest pain.  There is a strong family history of coronary artery disease.    Diagnoses and all orders for this visit:    Well adult exam    Other hyperlipidemia    Chest pain, unspecified type  -     IN OFFICE EKG 12-LEAD (to Muse)  -     Stress Echo Which stress agent will be used? Treadmill Exercise; Color Flow Doppler? Yes; Future    Acquired hypothyroidism    Age-related osteoporosis without current pathological fracture    Vitamin D deficiency         Follow up in about 1 year (around 7/27/2024).     Zachery Mustafa MD

## 2023-08-15 ENCOUNTER — TELEPHONE (OUTPATIENT)
Dept: INTERNAL MEDICINE | Facility: CLINIC | Age: 68
End: 2023-08-15
Payer: COMMERCIAL

## 2023-08-15 NOTE — TELEPHONE ENCOUNTER
----- Message from Ania Kay sent at 8/15/2023 10:37 AM CDT -----  FYI    Pt cancelled Stress Echo Which stress agent will be used? Treadmill Exercise; Color Flow Doppler? Yes reason condition improved.    Thanks

## 2023-10-16 RX ORDER — LEVOTHYROXINE SODIUM 50 UG/1
50 TABLET ORAL
Qty: 90 TABLET | Refills: 3 | Status: SHIPPED | OUTPATIENT
Start: 2023-10-16

## 2023-10-16 NOTE — TELEPHONE ENCOUNTER
Care Due:                  Date            Visit Type   Department     Provider  --------------------------------------------------------------------------------                                EP -                              PRIMARY      [facility] INTERNAL  Last Visit: 07-      [facility] ([facility]   MEDICINE       Zachery Mustafa  Next Visit: None Scheduled  None         None Found                                                            Last  Test          Frequency    Reason                     Performed    Due Date  --------------------------------------------------------------------------------    Mg Level....  12 months..  alendronate..............  Not Found    Overdue    Phosphate...  12 months..  alendronate..............  Not Found    Overdue    [employer] Embedded Care Due Messages. Reference number: [id number].   10/16/2023 3:16:33 AM CDT

## 2023-10-17 NOTE — TELEPHONE ENCOUNTER
Provider Staff:  Action required for this patient     Please see care gap opportunities below in Care Due Message: Labs (Mg Level, phosphate) outdated.     Thanks!  [facility]     Appointments     Last Visit   7/27/2023 Zachery Mustafa MD   Next Visit   Visit date not found Zachery Mustafa MD     Refill Decision Note   Maya Ervin  is requesting a refill authorization.  Brief Assessment and Rationale for Refill:  Approve       Medication Therapy Plan:  Labs (Mg Level, phosphate) outdated         Comments:     Note composed:10:03 PM 10/16/2023

## 2023-12-30 NOTE — TELEPHONE ENCOUNTER
Care Due:                  Date            Visit Type   Department     Provider  --------------------------------------------------------------------------------                                EP -                              PRIMARY      [facility] INTERNAL  Last Visit: 07-      [facility] ([facility]   MEDICINE       Zachery Mustafa  Next Visit: None Scheduled  None         None Found                                                            Last  Test          Frequency    Reason                     Performed    Due Date  --------------------------------------------------------------------------------    Mg Level....  12 months..  alendronate..............  Not Found    Overdue    Phosphate...  12 months..  alendronate..............  Not Found    Overdue    [employer] Embedded Care Due Messages. Reference number: [id number].   12/30/2023 3:16:32 AM CST

## 2023-12-31 NOTE — TELEPHONE ENCOUNTER
Refill Routing Note   Medication(s) are not appropriate for processing by [facility] for the following reason(s):        Outside of protocol    [facility] action(s):  Route               Appointments  past 12m or future 3m with PCP    Date Provider   Last Visit   7/27/2023 Zachery Mustafa MD   Next Visit   Visit date not found Zachery Mustafa MD   ED visits in past 90 days: 0        Note composed:10:40 AM 12/31/2023

## 2024-01-03 RX ORDER — ALENDRONATE SODIUM 70 MG/1
70 TABLET ORAL
Qty: 4 TABLET | Refills: 11 | Status: SHIPPED | OUTPATIENT
Start: 2024-01-03

## 2024-01-29 ENCOUNTER — TELEPHONE (OUTPATIENT)
Dept: INTERNAL MEDICINE | Facility: CLINIC | Age: 69
End: 2024-01-29
Payer: COMMERCIAL

## 2024-01-29 DIAGNOSIS — Z12.31 BREAST CANCER SCREENING BY MAMMOGRAM: ICD-10-CM

## 2024-01-29 DIAGNOSIS — Z00.00 WELL ADULT EXAM: Primary | ICD-10-CM

## 2024-01-29 NOTE — TELEPHONE ENCOUNTER
----- Message from Pool Smith sent at 1/29/2024  3:53 PM CST -----  Contact: Pt [phone]  type: Lab    Caller is requesting to schedule their Lab appointment prior to annual appointment.  Please send lab order to [facility] in [place] and contact patient to schedule.    Thank you

## 2024-01-31 NOTE — TELEPHONE ENCOUNTER
I entered lab orders for [facility].  Patient advise dto do in July prior to appt.    She also wants to book mmg.    Order entered and scheduled with her for July.    Appt slips mailed w/ note to do labs at [facility]  In July.

## 2024-05-22 ENCOUNTER — PATIENT MESSAGE (OUTPATIENT)
Dept: ADMINISTRATIVE | Facility: HOSPITAL | Age: 69
End: 2024-05-22
Payer: COMMERCIAL

## 2024-07-29 ENCOUNTER — OFFICE VISIT (OUTPATIENT)
Dept: INTERNAL MEDICINE | Facility: CLINIC | Age: 69
End: 2024-07-29
Payer: COMMERCIAL

## 2024-07-29 ENCOUNTER — HOSPITAL ENCOUNTER (OUTPATIENT)
Dept: RADIOLOGY | Facility: HOSPITAL | Age: 69
Discharge: HOME | End: 2024-07-29
Attending: INTERNAL MEDICINE
Payer: COMMERCIAL

## 2024-07-29 VITALS
HEART RATE: 84 BPM | TEMPERATURE: 97 F | BODY MASS INDEX: 21.86 KG/M2 | RESPIRATION RATE: 16 BRPM | SYSTOLIC BLOOD PRESSURE: 114 MMHG | OXYGEN SATURATION: 98 % | WEIGHT: 136 LBS | HEIGHT: 66 IN | DIASTOLIC BLOOD PRESSURE: 72 MMHG

## 2024-07-29 DIAGNOSIS — E78.49 OTHER HYPERLIPIDEMIA: ICD-10-CM

## 2024-07-29 DIAGNOSIS — Z00.00 WELL ADULT EXAM: Primary | ICD-10-CM

## 2024-07-29 DIAGNOSIS — Z12.31 BREAST CANCER SCREENING BY MAMMOGRAM: ICD-10-CM

## 2024-07-29 DIAGNOSIS — M81.0 AGE-RELATED OSTEOPOROSIS WITHOUT CURRENT PATHOLOGICAL FRACTURE: ICD-10-CM

## 2024-07-29 DIAGNOSIS — E03.9 ACQUIRED HYPOTHYROIDISM: ICD-10-CM

## 2024-07-29 PROCEDURE — 77063 BREAST TOMOSYNTHESIS BI: CPT | Mod: 26,,, | Performed by: RADIOLOGY

## 2024-07-29 PROCEDURE — 1126F AMNT PAIN NOTED NONE PRSNT: CPT | Mod: CPTII,S$GLB,, | Performed by: INTERNAL MEDICINE

## 2024-07-29 PROCEDURE — 77067 SCR MAMMO BI INCL CAD: CPT | Mod: TC,PO

## 2024-07-29 PROCEDURE — 99999 PR PBB SHADOW E&M-EST. PATIENT-LVL IV: CPT | Mod: PBBFAC,,, | Performed by: INTERNAL MEDICINE

## 2024-07-29 PROCEDURE — 1160F RVW MEDS BY RX/DR IN RCRD: CPT | Mod: CPTII,S$GLB,, | Performed by: INTERNAL MEDICINE

## 2024-07-29 PROCEDURE — 77067 SCR MAMMO BI INCL CAD: CPT | Mod: 26,,, | Performed by: RADIOLOGY

## 2024-07-29 PROCEDURE — 1159F MED LIST DOCD IN RCRD: CPT | Mod: CPTII,S$GLB,, | Performed by: INTERNAL MEDICINE

## 2024-07-29 PROCEDURE — 3008F BODY MASS INDEX DOCD: CPT | Mod: CPTII,S$GLB,, | Performed by: INTERNAL MEDICINE

## 2024-07-29 PROCEDURE — 77063 BREAST TOMOSYNTHESIS BI: CPT | Mod: TC,PO

## 2024-07-29 PROCEDURE — 99397 PER PM REEVAL EST PAT 65+ YR: CPT | Mod: S$GLB,,, | Performed by: INTERNAL MEDICINE

## 2024-07-29 PROCEDURE — 3074F SYST BP LT 130 MM HG: CPT | Mod: CPTII,S$GLB,, | Performed by: INTERNAL MEDICINE

## 2024-07-29 PROCEDURE — 1101F PT FALLS ASSESS-DOCD LE1/YR: CPT | Mod: CPTII,S$GLB,, | Performed by: INTERNAL MEDICINE

## 2024-07-29 PROCEDURE — 3044F HG A1C LEVEL LT 7.0%: CPT | Mod: CPTII,S$GLB,, | Performed by: INTERNAL MEDICINE

## 2024-07-29 PROCEDURE — 3288F FALL RISK ASSESSMENT DOCD: CPT | Mod: CPTII,S$GLB,, | Performed by: INTERNAL MEDICINE

## 2024-07-29 PROCEDURE — 3078F DIAST BP <80 MM HG: CPT | Mod: CPTII,S$GLB,, | Performed by: INTERNAL MEDICINE

## 2024-07-29 NOTE — PROGRESS NOTES
Subjective:       Patient ID: Maya Ervin is a 69 y.o. female.    Chief Complaint: Annual Exam    HPI  The patient presents for annual physical examination.  The patient reports she is doing well.  She remains physically active.  She walks 3 days a week for exercise.  She also performs her own housework and lawn care.  Her exercise tolerance is unchanged.  No exertional chest pain or dyspnea noted.    Active medical conditions include hyperlipidemia, hypothyroidism, and osteoporosis.  She is currently on Fosamax weekly for treatment of osteoporosis.  She has not experienced any dysphagia or reflux.  No abdominal pain is noted.    She is using levothyroxine every morning for treatment of her hypothyroidism.  She is not experiencing any tremors or palpitations.    The patient states she normally follows a low-fat diet.  She has reduced her intake of sugar as well as fried foods.  She is not interested in taking a cholesterol medication however.    Immunization record was reviewed.    Screening tests were reviewed.  She is due for 2 year follow-up bone density study.    Review of Systems   Constitutional:  Negative for fatigue, fever and unexpected weight change.   HENT:  Negative for nasal congestion, postnasal drip, rhinorrhea and sore throat.    Eyes:  Negative for visual disturbance.   Respiratory:  Negative for cough, chest tightness, shortness of breath and wheezing.    Cardiovascular:  Negative for chest pain, palpitations and leg swelling.   Gastrointestinal:  Negative for abdominal pain and blood in stool.   Genitourinary:  Negative for dysuria, frequency and hematuria.   Musculoskeletal:  Negative for arthralgias, back pain, joint swelling and myalgias.   Integumentary:  Negative for rash.   Neurological:  Negative for dizziness, syncope, weakness, numbness and headaches.   Psychiatric/Behavioral:  Negative for sleep disturbance. The patient is not nervous/anxious.             Physical Exam  Vitals and  nursing note reviewed.   Constitutional:       General: She is not in acute distress.     Appearance: Normal appearance. She is well-developed.   HENT:      Head: Normocephalic and atraumatic.      Right Ear: External ear normal.      Left Ear: External ear normal.      Nose: Nose normal.      Mouth/Throat:      Mouth: Mucous membranes are moist.      Pharynx: Oropharynx is clear. No oropharyngeal exudate.   Eyes:      General: No scleral icterus.     Extraocular Movements: Extraocular movements intact.      Conjunctiva/sclera: Conjunctivae normal.   Neck:      Thyroid: No thyromegaly.      Vascular: No carotid bruit or JVD.   Cardiovascular:      Rate and Rhythm: Normal rate and regular rhythm.      Pulses: Normal pulses.      Heart sounds: Normal heart sounds. No murmur heard.     No friction rub. No gallop.   Pulmonary:      Effort: Pulmonary effort is normal. No respiratory distress.      Breath sounds: Normal breath sounds. No wheezing or rales.   Abdominal:      General: Bowel sounds are normal. There is no abdominal bruit.      Palpations: Abdomen is soft. There is no hepatomegaly, splenomegaly or mass.      Tenderness: There is no abdominal tenderness.      Hernia: No hernia is present.   Musculoskeletal:         General: No tenderness. Normal range of motion.      Right shoulder: No deformity or effusion.      Cervical back: Normal range of motion and neck supple.      Right lower leg: No edema.      Left lower leg: No edema.   Lymphadenopathy:      Cervical: No cervical adenopathy.      Upper Body:      Right upper body: No supraclavicular adenopathy.      Left upper body: No supraclavicular adenopathy.   Skin:     General: Skin is warm and dry.      Findings: No rash.   Neurological:      Mental Status: She is alert and oriented to person, place, and time.      Cranial Nerves: No cranial nerve deficit.   Psychiatric:         Mood and Affect: Mood normal.         Speech: Speech normal.         Behavior:  Behavior normal.           No visits with results within 3 Month(s) from this visit.   Latest known visit with results is:   Telephone on 01/29/2024   Component Date Value Ref Range Status    Glucose 07/22/2024 92  65 - 99 mg/dL Final    Comment:               Fasting reference interval         BUN 07/22/2024 8  7 - 25 mg/dL Final    Creatinine 07/22/2024 0.73  0.50 - 1.05 mg/dL Final    eGFR 07/22/2024 90  > OR = 60 mL/min/1.73m2 Final    BUN/Creatinine Ratio 07/22/2024 SEE NOTE:  6 - 22 (calc) Final    Comment:    Not Reported: BUN and Creatinine are within     reference range.            Sodium 07/22/2024 141  135 - 146 mmol/L Final    Potassium 07/22/2024 3.9  3.5 - 5.3 mmol/L Final    Chloride 07/22/2024 106  98 - 110 mmol/L Final    CO2 07/22/2024 28  20 - 32 mmol/L Final    Calcium 07/22/2024 9.0  8.6 - 10.4 mg/dL Final    Total Protein 07/22/2024 7.0  6.1 - 8.1 g/dL Final    Albumin 07/22/2024 4.3  3.6 - 5.1 g/dL Final    Globulin, Total 07/22/2024 2.7  1.9 - 3.7 g/dL (calc) Final    Albumin/Globulin Ratio 07/22/2024 1.6  1.0 - 2.5 (calc) Final    Total Bilirubin 07/22/2024 0.6  0.2 - 1.2 mg/dL Final    Alkaline Phosphatase 07/22/2024 44  37 - 153 U/L Final    AST 07/22/2024 22  10 - 35 U/L Final    ALT 07/22/2024 22  6 - 29 U/L Final    Cholesterol 07/22/2024 209 (H)  <200 mg/dL Final    HDL 07/22/2024 62  > OR = 50 mg/dL Final    Triglycerides 07/22/2024 102  <150 mg/dL Final    LDL Cholesterol 07/22/2024 127 (H)  mg/dL (calc) Final    Comment: Reference range: <100     Desirable range <100 mg/dL for primary prevention;    <70 mg/dL for patients with CHD or diabetic patients   with > or = 2 CHD risk factors.     LDL-C is now calculated using the Emery-Sylvia   calculation, which is a validated novel method providing   better accuracy than the Friedewald equation in the   estimation of LDL-C.   Emery BECKWITH et al. NADINE. 2013;310(11): 4944-9297   (http://education.[employer]/faq/PCN220)       HDL/Cholesterol Ratio 07/22/2024 3.4  <5.0 (calc) Final    Non HDL Chol. (LDL+VLDL) 07/22/2024 147 (H)  <130 mg/dL (calc) Final    Comment: For patients with diabetes plus 1 major ASCVD risk   factor, treating to a non-HDL-C goal of <100 mg/dL   (LDL-C of <70 mg/dL) is considered a therapeutic   option.      WBC 07/22/2024 3.4 (L)  3.8 - 10.8 Thousand/uL Final    RBC 07/22/2024 4.41  3.80 - 5.10 Million/uL Final    Hemoglobin 07/22/2024 13.2  11.7 - 15.5 g/dL Final    Hematocrit 07/22/2024 42.4  35.0 - 45.0 % Final    MCV 07/22/2024 96.1  80.0 - 100.0 fL Final    MCH 07/22/2024 29.9  27.0 - 33.0 pg Final    MCHC 07/22/2024 31.1 (L)  32.0 - 36.0 g/dL Final    RDW 07/22/2024 11.9  11.0 - 15.0 % Final    Platelets 07/22/2024 273  140 - 400 Thousand/uL Final    MPV 07/22/2024 9.2  7.5 - 12.5 fL Final    Neutrophils, Abs 07/22/2024 1,826  1,500 - 7,800 cells/uL Final    Lymph # 07/22/2024 1,319  850 - 3,900 cells/uL Final    Mono # 07/22/2024 184 (L)  200 - 950 cells/uL Final    Eos # 07/22/2024 41  15 - 500 cells/uL Final    Baso # 07/22/2024 31  0 - 200 cells/uL Final    Neutrophils Relative 07/22/2024 53.7  % Final    Lymph % 07/22/2024 38.8  % Final    Mono % 07/22/2024 5.4  % Final    Eosinophil % 07/22/2024 1.2  % Final    Basophil % 07/22/2024 0.9  % Final    T4, Free 07/22/2024 1.1  0.8 - 1.8 ng/dL Final    TSH 07/22/2024 3.62  0.40 - 4.50 mIU/L Final    Hemoglobin A1C 07/22/2024 5.5  <5.7 % of total Hgb Final    Comment: For the purpose of screening for the presence of  diabetes:     <5.7%       Consistent with the absence of diabetes  5.7-6.4%    Consistent with increased risk for diabetes              (prediabetes)  > or =6.5%  Consistent with diabetes     This assay result is consistent with a decreased risk  of diabetes.     Currently, no consensus exists regarding use of  hemoglobin A1c for diagnosis of diabetes in children.     According to American Diabetes Association (ADA)  guidelines, hemoglobin A1c  <7.0% represents optimal  control in non-pregnant diabetic patients. Different  metrics may apply to specific patient populations.   Standards of Medical Care in Diabetes(ADA).         This test was performed on the Roche neda c503 platform.  Effective 11/13/23, a change in test platforms from the  [employer]  to the Roche neda c503 may have shifted  HbA1c results compared to historical results.  Based on laboratory validation testing conducted at  [facility], the Roche platform relative to the                            [employer]  platform had an average increase in HbA1c value of  < or = 0.3%. This difference is within accepted   variability established by the National Glycohemoglobin  Standardization Program. Note that not all individuals  will have had a shift in their results and direct  comparisons between historical and current results for  testing conducted on different platforms is not  recommended.          Vitamin D, 25-OH, Total 07/22/2024 40  30 - 100 ng/mL Final    Comment: Vitamin D Status         25-OH Vitamin D:     Deficiency:                    <20 ng/mL  Insufficiency:             20 - 29 ng/mL  Optimal:                 > or = 30 ng/mL     For 25-OH Vitamin D testing on patients on   D2-supplementation and patients for whom quantitation   of D2 and D3 fractions is required, the QuestAssureD(TM)  25-OH VIT D, (D2,D3), LC/MS/MS is recommended: order   code 38876 (patients >2yrs).     See Note 1     Note 1     For additional information, please refer to   http://education.[employer]/faq/EPH274   (This link is being provided for informational/  educational purposes only.)      Color, UA 07/22/2024 DARK YELLOW  YELLOW Final    Appearance, UA 07/22/2024 CLEAR  CLEAR Final    Specific Gravity, UA 07/22/2024 1.024  1.001 - 1.035 Final    pH, UA 07/22/2024 6.0  5.0 - 8.0 Final    Glucose, UA 07/22/2024 NEGATIVE  NEGATIVE Final    Bilirubin, UA 07/22/2024 NEGATIVE  NEGATIVE Final    Ketones, UA  07/22/2024 NEGATIVE  NEGATIVE Final    Occult Blood UA 07/22/2024 NEGATIVE  NEGATIVE Final    Protein, UA 07/22/2024 NEGATIVE  NEGATIVE Final    Nitrite, UA 07/22/2024 NEGATIVE  NEGATIVE Final    Leukocytes, UA 07/22/2024 NEGATIVE  NEGATIVE Final       Assessment & Plan:      Maya was seen today for annual exam.  Current medications will be continued for treatment of hypothyroidism and osteoporosis as noted in the history of present illness.  An updated bone density study will be obtained.  The patient is having her screening mammogram today.    The patient should continue following a low-fat diet and.    Diagnoses and all orders for this visit:    Well adult exam    Acquired hypothyroidism    Other hyperlipidemia    Age-related osteoporosis without current pathological fracture  -     DXA Bone Density Axial Skeleton 1 or more sites; Future         Follow up in about 1 year (around 7/29/2025).     Zahcery Mustafa MD

## 2024-08-13 ENCOUNTER — APPOINTMENT (OUTPATIENT)
Dept: RADIOLOGY | Facility: CLINIC | Age: 69
End: 2024-08-13
Attending: INTERNAL MEDICINE
Payer: COMMERCIAL

## 2024-08-13 DIAGNOSIS — M81.0 AGE-RELATED OSTEOPOROSIS WITHOUT CURRENT PATHOLOGICAL FRACTURE: ICD-10-CM

## 2024-08-13 PROCEDURE — 77080 DXA BONE DENSITY AXIAL: CPT | Mod: 26,,, | Performed by: INTERNAL MEDICINE

## 2024-08-13 PROCEDURE — 77080 DXA BONE DENSITY AXIAL: CPT | Mod: TC,PO

## 2024-10-11 RX ORDER — LEVOTHYROXINE SODIUM 50 UG/1
50 TABLET ORAL
Qty: 90 TABLET | Refills: 3 | Status: SHIPPED | OUTPATIENT
Start: 2024-10-11

## 2024-10-11 NOTE — TELEPHONE ENCOUNTER
Refill Decision Note   Maya Ervin  is requesting a refill authorization.  Brief Assessment and Rationale for Refill:  Approve     Medication Therapy Plan:         Comments:     Note composed:10:10 AM 10/11/2024

## 2024-10-11 NOTE — TELEPHONE ENCOUNTER
Care Due:                  Date            Visit Type   Department     Provider  --------------------------------------------------------------------------------                                EP -                              PRIMARY      [facility] INTERNAL  Last Visit: 07-      [facility] ([facility]   MEDICINE       Zachery Mustafa  Next Visit: None Scheduled  None         None Found                                                            Last  Test          Frequency    Reason                     Performed    Due Date  --------------------------------------------------------------------------------    Mg Level....  12 months..  alendronate..............  Not Found    Overdue    Phosphate...  12 months..  alendronate..............  Not Found    Overdue    [employer] Embedded Care Due Messages. Reference number: [id number].   10/11/2024 3:20:33 AM CDT

## 2024-12-03 RX ORDER — ALENDRONATE SODIUM 70 MG/1
70 TABLET ORAL
Qty: 4 TABLET | Refills: 11 | Status: SHIPPED | OUTPATIENT
Start: 2024-12-03

## 2024-12-03 NOTE — TELEPHONE ENCOUNTER
Refill Routing Note   Medication(s) are not appropriate for processing by [facility] for the following reason(s):        Outside of protocol    [facility] action(s):  Route               Appointments  past 12m or future 3m with PCP    Date Provider   Last Visit   7/29/2024 Zachery Mustafa MD   Next Visit   Visit date not found Zachery Mustafa MD   ED visits in past 90 days: 0        Note composed:11:18 AM 12/03/2024

## 2024-12-03 NOTE — TELEPHONE ENCOUNTER
No care due was identified.  [facility] Embedded Care Due Messages. Reference number: [id number].   12/03/2024 3:19:22 AM CST

## 2025-02-05 RX ORDER — ALENDRONATE SODIUM 70 MG/1
70 TABLET ORAL
Qty: 4 TABLET | Refills: 11 | Status: SHIPPED | OUTPATIENT
Start: 2025-02-05

## 2025-02-05 NOTE — TELEPHONE ENCOUNTER
Refill Routing Note   Medication(s) are not appropriate for processing by [facility] for the following reason(s):        Outside of protocol    [facility] action(s):  Route             Appointments  past 12m or future 3m with PCP    Date Provider   Last Visit   7/29/2024 Zachery Mustafa MD   Next Visit   Visit date not found Zachery Mustafa MD   ED visits in past 90 days: 0        Note composed:8:41 AM 02/05/2025

## 2025-02-05 NOTE — TELEPHONE ENCOUNTER
Care Due:                  Date            Visit Type   Department     Provider  --------------------------------------------------------------------------------                                EP -                              PRIMARY      [facility] INTERNAL  Last Visit: 07-      [facility] ([facility]   MEDICINE       Zachery Mustafa  Next Visit: None Scheduled  None         None Found                                                            Last  Test          Frequency    Reason                     Performed    Due Date  --------------------------------------------------------------------------------    Mg Level....  12 months..  alendronate..............  Not Found    Overdue    Phosphate...  12 months..  alendronate..............  Not Found    Overdue    [employer] Embedded Care Due Messages. Reference number: [id number].   2/05/2025 3:19:20 AM CST

## 2025-02-17 ENCOUNTER — TELEPHONE (OUTPATIENT)
Dept: INTERNAL MEDICINE | Facility: CLINIC | Age: 70
End: 2025-02-17
Payer: COMMERCIAL

## 2025-02-17 DIAGNOSIS — Z12.31 BREAST CANCER SCREENING BY MAMMOGRAM: Primary | ICD-10-CM

## 2025-02-17 NOTE — TELEPHONE ENCOUNTER
"----- Message from Maribel sent at 2/17/2025 10:37 AM CST -----  Contact: [phone]  Caller is requesting to schedule their annual screening mammogram appointment. Order is not listed in Epic.  Please enter order and contact patient to schedule.Would the patient like a call back, or a response through their [facility] portal?:   Call back Where would they like the mammogram performed?: Met on 07/31/2025 after seen Dr. Mustafa Comments:type: Jaisonjeannette is requesting to schedule their Lab appointment prior to an appointment.Order is not listed in EPIC.  Please enter order and contact patient to schedule.Preferred Date and Time of Labs: a week before her annual Date of Appointment:07/31/2025Where would they like the lab performed?[facility] Would the patient rather a call back or a response via [facility]? Call back [facility] Call Back Number:[phone] Information:Urine test and Blood test. "  "

## 2025-03-26 ENCOUNTER — TELEPHONE (OUTPATIENT)
Dept: INTERNAL MEDICINE | Facility: CLINIC | Age: 70
End: 2025-03-26
Payer: COMMERCIAL

## 2025-03-26 DIAGNOSIS — Z00.00 WELL ADULT EXAM: Primary | ICD-10-CM

## 2025-03-26 NOTE — TELEPHONE ENCOUNTER
"----- Message from Yomi Shine sent at 3/26/2025  1:31 PM CDT -----  Contact: [phone]    ----- Message -----  From: Molly Gaspar  Sent: 3/26/2025  12:06 PM CDT  To: [facility] Staff    type: LabCaller is requesting to schedule their Lab appointment prior to an appointment.Order is not listed in EPIC.  Please enter order and contact patient to schedule.Preferred Date and Time of Labs: before annualDate of Appointment:7/31Where would they like the lab performed?[facility] the patient rather a call back or a response via [facility]? [facility] [place] Call Back Number: [phone] Information:[facility]"Do not send messages requesting lab orders prior to Physical appt on these providers: Megha Chua, Shay Arias,  Luciano Ruff and Joe."  "

## 2025-03-26 NOTE — TELEPHONE ENCOUNTER
Orders entered and booked.  Mailed appt slip and she will see on portal.    Dr walker likes a week prior so he can review

## 2025-04-01 ENCOUNTER — TELEPHONE (OUTPATIENT)
Dept: INTERNAL MEDICINE | Facility: CLINIC | Age: 70
End: 2025-04-01
Payer: COMMERCIAL

## 2025-04-01 NOTE — TELEPHONE ENCOUNTER
----- Message from Med Assistant Shine sent at 4/1/2025  7:59 AM CDT -----  Contact: [phone]    ----- Message -----  From: Aniya Raines  Sent: 4/1/2025   7:33 AM CDT  To: [facility] Staff    .1MEDICALADVICE Patient is calling for Medical Advice regarding:[facility] How long has patient had these symptoms: per her insurance Pharmacy name and phone#:Patient wants a call back or thru [facility]:call back Comments:She states she is needing this before her appt with you on 07/31 please advise Please advise patient replies from provider may take up to 48 hours.

## 2025-04-01 NOTE — TELEPHONE ENCOUNTER
Called her and confirmed I have orders entered  For [facility].    She will do a week before July visit.    I told her any [facility] will work

## 2025-07-31 ENCOUNTER — HOSPITAL ENCOUNTER (OUTPATIENT)
Dept: RADIOLOGY | Facility: HOSPITAL | Age: 70
Discharge: HOME | End: 2025-07-31
Attending: INTERNAL MEDICINE
Payer: COMMERCIAL

## 2025-07-31 ENCOUNTER — OFFICE VISIT (OUTPATIENT)
Dept: INTERNAL MEDICINE | Facility: CLINIC | Age: 70
End: 2025-07-31
Payer: COMMERCIAL

## 2025-07-31 VITALS
BODY MASS INDEX: 20.9 KG/M2 | WEIGHT: 130.06 LBS | TEMPERATURE: 97 F | DIASTOLIC BLOOD PRESSURE: 84 MMHG | HEART RATE: 77 BPM | SYSTOLIC BLOOD PRESSURE: 110 MMHG | OXYGEN SATURATION: 98 % | HEIGHT: 66 IN

## 2025-07-31 VITALS — WEIGHT: 130 LBS | HEIGHT: 65 IN | BODY MASS INDEX: 21.66 KG/M2

## 2025-07-31 DIAGNOSIS — E03.9 ACQUIRED HYPOTHYROIDISM: Chronic | ICD-10-CM

## 2025-07-31 DIAGNOSIS — E78.49 OTHER HYPERLIPIDEMIA: ICD-10-CM

## 2025-07-31 DIAGNOSIS — M81.0 AGE-RELATED OSTEOPOROSIS WITHOUT CURRENT PATHOLOGICAL FRACTURE: ICD-10-CM

## 2025-07-31 DIAGNOSIS — Z12.31 BREAST CANCER SCREENING BY MAMMOGRAM: ICD-10-CM

## 2025-07-31 DIAGNOSIS — Z00.00 WELL ADULT EXAM: Primary | ICD-10-CM

## 2025-07-31 PROCEDURE — 3074F SYST BP LT 130 MM HG: CPT | Mod: CPTII,S$GLB,, | Performed by: INTERNAL MEDICINE

## 2025-07-31 PROCEDURE — 1159F MED LIST DOCD IN RCRD: CPT | Mod: CPTII,S$GLB,, | Performed by: INTERNAL MEDICINE

## 2025-07-31 PROCEDURE — 3044F HG A1C LEVEL LT 7.0%: CPT | Mod: CPTII,S$GLB,, | Performed by: INTERNAL MEDICINE

## 2025-07-31 PROCEDURE — 3008F BODY MASS INDEX DOCD: CPT | Mod: CPTII,S$GLB,, | Performed by: INTERNAL MEDICINE

## 2025-07-31 PROCEDURE — 1126F AMNT PAIN NOTED NONE PRSNT: CPT | Mod: CPTII,S$GLB,, | Performed by: INTERNAL MEDICINE

## 2025-07-31 PROCEDURE — 1160F RVW MEDS BY RX/DR IN RCRD: CPT | Mod: CPTII,S$GLB,, | Performed by: INTERNAL MEDICINE

## 2025-07-31 PROCEDURE — 99397 PER PM REEVAL EST PAT 65+ YR: CPT | Mod: S$GLB,,, | Performed by: INTERNAL MEDICINE

## 2025-07-31 PROCEDURE — 3079F DIAST BP 80-89 MM HG: CPT | Mod: CPTII,S$GLB,, | Performed by: INTERNAL MEDICINE

## 2025-07-31 PROCEDURE — 3288F FALL RISK ASSESSMENT DOCD: CPT | Mod: CPTII,S$GLB,, | Performed by: INTERNAL MEDICINE

## 2025-07-31 PROCEDURE — 99999 PR PBB SHADOW E&M-EST. PATIENT-LVL III: CPT | Mod: PBBFAC,,, | Performed by: INTERNAL MEDICINE

## 2025-07-31 PROCEDURE — 77067 SCR MAMMO BI INCL CAD: CPT | Mod: TC

## 2025-07-31 PROCEDURE — 1101F PT FALLS ASSESS-DOCD LE1/YR: CPT | Mod: CPTII,S$GLB,, | Performed by: INTERNAL MEDICINE

## 2025-07-31 NOTE — PROGRESS NOTES
Subjective:       Patient ID: Maya Ervin is a 70 y.o. female.    Chief Complaint: Annual Exam    History of Present Illness    Maya presents today for annual examination and follow-up of active medical conditions which include hyperlipidemia, hypothyroidism, and osteoporosis.  The patient remains physically active.  She is tolerating her medications well without side effects.    Immunization record was reviewed.    Screening tests were reviewed.  The patient is due for screening mammogram which is scheduled for later this morning.  She is up-to-date on her colonoscopy.    HYPERLIPIDEMIA:  She reports ongoing concerns with elevated cholesterol levels. Current total cholesterol ranges between 209 and 224, with LDL cholesterol increasing from 127 to 140, both above recommended ranges. She acknowledges consuming fried foods and recognizes dietary modifications may be necessary. She reports diet includes fish and chicken, and is willing to reduce fried food and seasoning intake to help manage cholesterol levels. She expresses preference to monitor cholesterol levels without medication at this time, stating desire to manage through lifestyle modifications. She understands cardiovascular risks associated with elevated cholesterol but currently opts for continued lifestyle management and monitoring rather than pharmacological intervention.  Both her father and brother  of heart attacks.     MEMORY CONCERNS:  She reports experiencing progressive memory changes, noting her memory is not as sharp as previously and requires more frequent documentation of information. She describes forgetting her purpose upon climbing stairs and needing to repeatedly verify routine tasks such as locking doors and setting alarms. She acknowledges occasionally repeating conversations without realizing she has already shared the information. She emphasizes these memory changes are mild and do not significantly impact her daily  functioning or ability to manage household responsibilities. She denies difficulty remembering conversations, recognizing people, or experiencing issues with financial management.     EXERCISE:  She reports continued engagement in regular exercise routine and remaining active. She specifically endorses performing stretching exercises prior to trail walking and hiking activities.    CONSTIPATION:  She reports recent constipation over the past few days, which was unusual for her typical bowel pattern. She normally has regular morning bowel movements and notes this recent episode was atypical. The constipation has since resolved.    SINUS ISSUES:  She reports recent sinus problems with drainage into the throat.  Symptoms have resolved.  She has not required any allergy medication.      ROS:  Head: -headaches  ENT: -sore throat, +post nasal drip  Cardiovascular: -lower extremity edema  Gastrointestinal: -abdominal pain, +constipation, -heartburn  Musculoskeletal: -back pain  Neurological: +memory loss              Physical Exam  Vitals and nursing note reviewed.   Constitutional:       General: She is not in acute distress.     Appearance: Normal appearance. She is well-developed.      Comments: The patient has lost 6 lb since 07/29/2024.   HENT:      Head: Normocephalic and atraumatic.      Right Ear: External ear normal.      Left Ear: External ear normal.      Nose: Nose normal.      Mouth/Throat:      Mouth: Mucous membranes are moist.      Pharynx: Oropharynx is clear. No oropharyngeal exudate.   Eyes:      General: No scleral icterus.     Extraocular Movements: Extraocular movements intact.      Conjunctiva/sclera: Conjunctivae normal.   Neck:      Thyroid: No thyromegaly.      Vascular: No carotid bruit or JVD.   Cardiovascular:      Rate and Rhythm: Normal rate and regular rhythm.      Pulses: Normal pulses.      Heart sounds: Normal heart sounds. No murmur heard.     No friction rub. No gallop.   Pulmonary:       Effort: Pulmonary effort is normal. No respiratory distress.      Breath sounds: Normal breath sounds. No wheezing or rales.   Abdominal:      General: Bowel sounds are normal. There is no abdominal bruit.      Palpations: Abdomen is soft. There is no hepatomegaly, splenomegaly or mass.      Tenderness: There is no abdominal tenderness.      Hernia: No hernia is present.   Musculoskeletal:         General: No tenderness. Normal range of motion.      Right shoulder: No deformity or effusion.      Cervical back: Normal range of motion and neck supple.      Right lower leg: No edema.      Left lower leg: No edema.   Lymphadenopathy:      Cervical: No cervical adenopathy.      Upper Body:      Right upper body: No supraclavicular adenopathy.      Left upper body: No supraclavicular adenopathy.   Skin:     General: Skin is warm and dry.      Findings: No rash.   Neurological:      Mental Status: She is alert and oriented to person, place, and time.      Cranial Nerves: No cranial nerve deficit.   Psychiatric:         Mood and Affect: Mood normal.         Speech: Speech normal.         Behavior: Behavior normal.           No visits with results within 3 Month(s) from this visit.   Latest known visit with results is:   Telephone on 03/26/2025   Component Date Value Ref Range Status    Hemoglobin A1C 07/24/2025 5.4  <5.7 % Final    Comment: For the purpose of screening for the presence of  diabetes:     <5.7%       Consistent with the absence of diabetes  5.7-6.4%    Consistent with increased risk for diabetes              (prediabetes)  > or =6.5%  Consistent with diabetes     This assay result is consistent with a decreased risk  of diabetes.     Currently, no consensus exists regarding use of  hemoglobin A1c for diagnosis of diabetes in children.     According to American Diabetes Association (ADA)  guidelines, hemoglobin A1c <7.0% represents optimal  control in non-pregnant diabetic patients. Different  metrics may  apply to specific patient populations.   Standards of Medical Care in Diabetes(ADA).          TSH 07/24/2025 3.01  0.40 - 4.50 mIU/L Final    WBC 07/24/2025 3.5 (L)  3.8 - 10.8 Thousand/uL Final    RBC 07/24/2025 4.51  3.80 - 5.10 Million/uL Final    Hemoglobin 07/24/2025 13.1  11.7 - 15.5 g/dL Final    Hematocrit 07/24/2025 42.1  35.0 - 45.0 % Final    MCV 07/24/2025 93.3  80.0 - 100.0 fL Final    MCH 07/24/2025 29.0  27.0 - 33.0 pg Final    MCHC 07/24/2025 31.1 (L)  32.0 - 36.0 g/dL Final    Comment: For adults, a slight decrease in the calculated MCHC  value (in the range of 30 to 32 g/dL) is most likely  not clinically significant; however, it should be  interpreted with caution in correlation with other  red cell parameters and the patient's clinical  condition.      RDW 07/24/2025 12.2  11.0 - 15.0 % Final    Platelets 07/24/2025 296  140 - 400 Thousand/uL Final    MPV 07/24/2025 9.3  7.5 - 12.5 fL Final    Neutrophils, Abs 07/24/2025 2,034  1,500 - 7,800 cells/uL Final    Lymph # 07/24/2025 1,152  850 - 3,900 cells/uL Final    Mono # 07/24/2025 193 (L)  200 - 950 cells/uL Final    Eos # 07/24/2025 81  15 - 500 cells/uL Final    Baso # 07/24/2025 42  0 - 200 cells/uL Final    Neutrophils Relative 07/24/2025 58.1  % Final    Lymph % 07/24/2025 32.9  % Final    Mono % 07/24/2025 5.5  % Final    Eosinophil % 07/24/2025 2.3  % Final    Basophil % 07/24/2025 1.2  % Final    Cholesterol 07/24/2025 224 (H)  <200 mg/dL Final    HDL 07/24/2025 65  > OR = 50 mg/dL Final    Triglycerides 07/24/2025 86  <150 mg/dL Final    LDL Cholesterol 07/24/2025 140 (H)  mg/dL (calc) Final    Comment: Reference range: <100     Desirable range <100 mg/dL for primary prevention;    <70 mg/dL for patients with CHD or diabetic patients   with > or = 2 CHD risk factors.     LDL-C is now calculated using the Emery-Ward   calculation, which is a validated novel method providing   better accuracy than the Friedewald equation in the    estimation of LDL-C.   Emery SS et al. NADINE. 2013;310(19): 7102-2097   (http://education.[employer].Jirafe/faq/FDV149)      HDL/Cholesterol Ratio 07/24/2025 3.4  <5.0 (calc) Final    Non HDL Chol. (LDL+VLDL) 07/24/2025 159 (H)  <130 mg/dL (calc) Final    Comment: For patients with diabetes plus 1 major ASCVD risk   factor, treating to a non-HDL-C goal of <100 mg/dL   (LDL-C of <70 mg/dL) is considered a therapeutic   option.      Glucose 07/24/2025 88  65 - 99 mg/dL Final    Comment:               Fasting reference interval         BUN 07/24/2025 10  7 - 25 mg/dL Final    Creatinine 07/24/2025 0.78  0.50 - 1.05 mg/dL Final    eGFR 07/24/2025 83  > OR = 60 mL/min/1.73m2 Final    BUN/Creatinine Ratio 07/24/2025 SEE NOTE:  6 - 22 (calc) Final    Comment:    Not Reported: BUN and Creatinine are within     reference range.            Sodium 07/24/2025 136  135 - 146 mmol/L Final    Potassium 07/24/2025 4.3  3.5 - 5.3 mmol/L Final    Chloride 07/24/2025 102  98 - 110 mmol/L Final    CO2 07/24/2025 27  20 - 32 mmol/L Final    Calcium 07/24/2025 8.9  8.6 - 10.4 mg/dL Final    Total Protein 07/24/2025 7.1  6.1 - 8.1 g/dL Final    Albumin 07/24/2025 4.5  3.6 - 5.1 g/dL Final    Globulin, Total 07/24/2025 2.6  1.9 - 3.7 g/dL (calc) Final    Albumin/Globulin Ratio 07/24/2025 1.7  1.0 - 2.5 (calc) Final    Total Bilirubin 07/24/2025 0.5  0.2 - 1.2 mg/dL Final    Alkaline Phosphatase 07/24/2025 46  37 - 153 U/L Final    AST 07/24/2025 23  10 - 35 U/L Final    ALT 07/24/2025 23  6 - 29 U/L Final       Assessment & Plan:     Assessment & Plan     Reviewed lab results: cholesterol increased from 209 to 224 mg/dL (goal <200 mg/dL) and LDL from 140 mg/dL (goal <100 mg/dL).   Family history of heart disease and current lifestyle factors increase cardiovascular risk.   Discussed potential need for cholesterol medication if dietary changes ineffective.   Thyroid function, renal function, liver function, and complete blood count  WNL.   Mild memory changes likely age-appropriate and not significantly impacting daily function.    MIXED HYPERLIPIDEMIA:   Monitored the patient's cholesterol which has increased from 209 to 224, and LDL cholesterol from 127 to 140, both above desirable ranges.   Assessed hereditary factors and dietary influences on the patient's cholesterol levels.   Explained the relationship between elevated cholesterol levels and increased risk of cardiovascular disease, myocardial infarction, and cerebrovascular accident.   Provided education on dietary modifications, advising patient to reduce intake of fried foods, fatty foods, red meat (beef, pork), and to decrease consumption of sugar and sweets.   Plan to repeat labs in 6 months for continued monitoring.    FAMILY HISTORY OF ISCHEMIC HEART DISEASE AND OTHER CIRCULATORY SYSTEM DISEASES:   Assessed family history noting father and brother  from myocardial infarctions, indicating a significant hereditary risk for cardiovascular disease.   This family history further emphasizes the importance of managing the patient's hyperlipidemia to reduce cardiovascular risk.    ALTERED MENTAL STATUS:   Monitored the patient's reports of mild memory impairment, including forgetting conversations and needing to write things down.   Evaluated memory impairment as mild and not impacting activities of daily living.   Discussed the difference between normal age-related memory changes versus signs of significant cognitive impairment.   Offered referral to a neuropsychologist for cognitive testing if needed in the future.  The patient wishes to defer any neuropsychological evaluation at this time.    FOLLOW-UP:   Maya to continue current exercise routine.   Will reassess cholesterol levels in 6 months with repeat labs.         Follow up in about 1 year (around 2026).     Zachery Mustafa MD